# Patient Record
Sex: MALE | Race: OTHER | Employment: STUDENT | URBAN - METROPOLITAN AREA
[De-identification: names, ages, dates, MRNs, and addresses within clinical notes are randomized per-mention and may not be internally consistent; named-entity substitution may affect disease eponyms.]

---
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
## 2019-03-18 ENCOUNTER — OFFICE VISIT (OUTPATIENT)
Dept: FAMILY MEDICINE CLINIC | Facility: CLINIC | Age: 19
End: 2019-03-18

## 2019-03-18 ENCOUNTER — LAB ENCOUNTER (OUTPATIENT)
Dept: LAB | Age: 19
End: 2019-03-18
Attending: FAMILY MEDICINE
Payer: COMMERCIAL

## 2019-03-18 VITALS
HEART RATE: 60 BPM | WEIGHT: 217 LBS | BODY MASS INDEX: 29.39 KG/M2 | RESPIRATION RATE: 16 BRPM | TEMPERATURE: 98 F | SYSTOLIC BLOOD PRESSURE: 120 MMHG | HEIGHT: 72 IN | DIASTOLIC BLOOD PRESSURE: 70 MMHG

## 2019-03-18 DIAGNOSIS — T50.905A ADVERSE DRUG INTERACTION WITH DIETARY SUPPLEMENT: ICD-10-CM

## 2019-03-18 DIAGNOSIS — Z13.21 SCREENING FOR ENDOCRINE, NUTRITIONAL, METABOLIC AND IMMUNITY DISORDER: ICD-10-CM

## 2019-03-18 DIAGNOSIS — Z11.3 SCREEN FOR STD (SEXUALLY TRANSMITTED DISEASE): ICD-10-CM

## 2019-03-18 DIAGNOSIS — Z13.0 SCREENING FOR ENDOCRINE, NUTRITIONAL, METABOLIC AND IMMUNITY DISORDER: ICD-10-CM

## 2019-03-18 DIAGNOSIS — Z13.228 SCREENING FOR ENDOCRINE, NUTRITIONAL, METABOLIC AND IMMUNITY DISORDER: ICD-10-CM

## 2019-03-18 DIAGNOSIS — Z13.29 SCREENING FOR ENDOCRINE, NUTRITIONAL, METABOLIC AND IMMUNITY DISORDER: ICD-10-CM

## 2019-03-18 DIAGNOSIS — Z00.00 ANNUAL PHYSICAL EXAM: Primary | ICD-10-CM

## 2019-03-18 LAB
ALBUMIN SERPL-MCNC: 4.1 G/DL (ref 3.4–5)
ALBUMIN/GLOB SERPL: 1.1 {RATIO} (ref 1–2)
ALP LIVER SERPL-CCNC: 82 U/L (ref 45–117)
ALT SERPL-CCNC: 36 U/L (ref 16–61)
ANION GAP SERPL CALC-SCNC: 7 MMOL/L (ref 0–18)
AST SERPL-CCNC: 25 U/L (ref 15–37)
BASOPHILS # BLD AUTO: 0.05 X10(3) UL (ref 0–0.2)
BASOPHILS NFR BLD AUTO: 0.7 %
BILIRUB SERPL-MCNC: 1.3 MG/DL (ref 0.1–2)
BUN BLD-MCNC: 19 MG/DL (ref 7–18)
BUN/CREAT SERPL: 15.4 (ref 10–20)
CALCIUM BLD-MCNC: 9.4 MG/DL (ref 8.5–10.1)
CHLORIDE SERPL-SCNC: 104 MMOL/L (ref 98–107)
CHOLEST SMN-MCNC: 135 MG/DL (ref ?–200)
CO2 SERPL-SCNC: 28 MMOL/L (ref 21–32)
CREAT BLD-MCNC: 1.23 MG/DL (ref 0.7–1.3)
DEPRECATED RDW RBC AUTO: 41.3 FL (ref 35.1–46.3)
EOSINOPHIL # BLD AUTO: 0.44 X10(3) UL (ref 0–0.7)
EOSINOPHIL NFR BLD AUTO: 6 %
ERYTHROCYTE [DISTWIDTH] IN BLOOD BY AUTOMATED COUNT: 12.7 % (ref 11–15)
FSH SERPL-ACNC: 1.3 MIU/ML (ref 0.7–10.8)
GLOBULIN PLAS-MCNC: 3.6 G/DL (ref 2.8–4.4)
GLUCOSE BLD-MCNC: 98 MG/DL (ref 70–99)
HCT VFR BLD AUTO: 47.7 % (ref 39–53)
HDLC SERPL-MCNC: 45 MG/DL (ref 40–59)
HGB BLD-MCNC: 16 G/DL (ref 13–17.5)
IMM GRANULOCYTES # BLD AUTO: 0.02 X10(3) UL (ref 0–1)
IMM GRANULOCYTES NFR BLD: 0.3 %
LDLC SERPL CALC-MCNC: 77 MG/DL (ref ?–100)
LH SERPL-ACNC: 2.6 MIU/ML (ref 1.2–10.6)
LYMPHOCYTES # BLD AUTO: 2.63 X10(3) UL (ref 1.5–5)
LYMPHOCYTES NFR BLD AUTO: 35.9 %
M PROTEIN MFR SERPL ELPH: 7.7 G/DL (ref 6.4–8.2)
MCH RBC QN AUTO: 29.5 PG (ref 26–34)
MCHC RBC AUTO-ENTMCNC: 33.5 G/DL (ref 31–37)
MCV RBC AUTO: 87.8 FL (ref 80–100)
MONOCYTES # BLD AUTO: 0.36 X10(3) UL (ref 0.1–1)
MONOCYTES NFR BLD AUTO: 4.9 %
NEUTROPHILS # BLD AUTO: 3.82 X10 (3) UL (ref 1.5–7.7)
NEUTROPHILS # BLD AUTO: 3.82 X10(3) UL (ref 1.5–7.7)
NEUTROPHILS NFR BLD AUTO: 52.2 %
NONHDLC SERPL-MCNC: 90 MG/DL (ref ?–130)
OSMOLALITY SERPL CALC.SUM OF ELEC: 290 MOSM/KG (ref 275–295)
PLATELET # BLD AUTO: 240 10(3)UL (ref 150–450)
POTASSIUM SERPL-SCNC: 3.9 MMOL/L (ref 3.5–5.1)
RBC # BLD AUTO: 5.43 X10(6)UL (ref 4.3–5.7)
SODIUM SERPL-SCNC: 139 MMOL/L (ref 136–145)
T4 FREE SERPL-MCNC: 1.2 NG/DL (ref 0.9–1.6)
TRIGL SERPL-MCNC: 63 MG/DL (ref 30–149)
TSI SER-ACNC: 4.51 MIU/ML (ref 0.36–3.74)
VLDLC SERPL CALC-MCNC: 13 MG/DL (ref 0–30)
WBC # BLD AUTO: 7.3 X10(3) UL (ref 4–11)

## 2019-03-18 PROCEDURE — 84403 ASSAY OF TOTAL TESTOSTERONE: CPT

## 2019-03-18 PROCEDURE — 36415 COLL VENOUS BLD VENIPUNCTURE: CPT

## 2019-03-18 PROCEDURE — 83002 ASSAY OF GONADOTROPIN (LH): CPT

## 2019-03-18 PROCEDURE — 99395 PREV VISIT EST AGE 18-39: CPT | Performed by: FAMILY MEDICINE

## 2019-03-18 PROCEDURE — 84402 ASSAY OF FREE TESTOSTERONE: CPT

## 2019-03-18 PROCEDURE — 83001 ASSAY OF GONADOTROPIN (FSH): CPT

## 2019-03-18 PROCEDURE — 84443 ASSAY THYROID STIM HORMONE: CPT

## 2019-03-18 PROCEDURE — 85025 COMPLETE CBC W/AUTO DIFF WBC: CPT

## 2019-03-18 PROCEDURE — 80053 COMPREHEN METABOLIC PANEL: CPT

## 2019-03-18 PROCEDURE — 80061 LIPID PANEL: CPT

## 2019-03-18 PROCEDURE — 84439 ASSAY OF FREE THYROXINE: CPT

## 2019-03-18 NOTE — PROGRESS NOTES
Chief Complaint:   Patient presents with: Annual    HPI:   This is a 22year old male presenting for annual physical.     Patient would like STD screen, currently asymptomatic.    Patient denies shortness of breath, denies chest pain and denies any recent abdominal distention. Endocrine: Negative for cold intolerance, heat intolerance, polydipsia, polyphagia and polyuria. Genitourinary: Negative for dysuria, hematuria, flank pain and difficulty urinating.    Musculoskeletal: Negative for joint pain, gait Abdominal: Soft. Bowel sounds are normal. He exhibits no distension. There is no tenderness. There is no rebound and no guarding. Musculoskeletal: Normal range of motion. He exhibits no tenderness or effusion.    Lymphadenopathy:     He has no cervical

## 2019-03-22 ENCOUNTER — TELEPHONE (OUTPATIENT)
Dept: FAMILY MEDICINE CLINIC | Facility: CLINIC | Age: 19
End: 2019-03-22

## 2019-03-22 DIAGNOSIS — R79.89 ELEVATED TSH: Primary | ICD-10-CM

## 2019-03-22 NOTE — TELEPHONE ENCOUNTER
----- Message from Kerri Mejia MD sent at 3/22/2019 11:11 AM CDT -----  Slightly elevated TSH, recheck tsh and t4 in 6 months.

## 2019-03-22 NOTE — TELEPHONE ENCOUNTER
Called pt at number on HIPAA, no answer and VM not set up. Called numbers in pt's chart, received no answer (no consent or VM). Mychart msg sent.

## 2019-03-23 LAB
TESTOSTERONE TOTAL: 407 NG/DL
TESTOSTERONE, FREE -MS/MS: 117.3 PG/ML

## 2019-11-20 ENCOUNTER — TELEPHONE (OUTPATIENT)
Dept: FAMILY MEDICINE CLINIC | Facility: CLINIC | Age: 19
End: 2019-11-20

## 2020-08-04 ENCOUNTER — OFFICE VISIT (OUTPATIENT)
Dept: FAMILY MEDICINE CLINIC | Facility: CLINIC | Age: 20
End: 2020-08-04

## 2020-08-04 VITALS
DIASTOLIC BLOOD PRESSURE: 78 MMHG | WEIGHT: 255 LBS | HEIGHT: 71 IN | TEMPERATURE: 99 F | HEART RATE: 78 BPM | BODY MASS INDEX: 35.7 KG/M2 | OXYGEN SATURATION: 98 % | SYSTOLIC BLOOD PRESSURE: 122 MMHG | RESPIRATION RATE: 16 BRPM

## 2020-08-04 DIAGNOSIS — Z00.00 ANNUAL PHYSICAL EXAM: Primary | ICD-10-CM

## 2020-08-04 DIAGNOSIS — Z00.00 LABORATORY EXAMINATION ORDERED AS PART OF A ROUTINE GENERAL MEDICAL EXAMINATION: ICD-10-CM

## 2020-08-04 DIAGNOSIS — F98.8 ATTENTION DEFICIT DISORDER, UNSPECIFIED HYPERACTIVITY PRESENCE: ICD-10-CM

## 2020-08-04 DIAGNOSIS — S09.93XS: ICD-10-CM

## 2020-08-04 DIAGNOSIS — J32.0 CHRONIC MAXILLARY SINUSITIS: ICD-10-CM

## 2020-08-04 PROCEDURE — 3074F SYST BP LT 130 MM HG: CPT | Performed by: FAMILY MEDICINE

## 2020-08-04 PROCEDURE — 3008F BODY MASS INDEX DOCD: CPT | Performed by: FAMILY MEDICINE

## 2020-08-04 PROCEDURE — 99395 PREV VISIT EST AGE 18-39: CPT | Performed by: FAMILY MEDICINE

## 2020-08-04 PROCEDURE — 3078F DIAST BP <80 MM HG: CPT | Performed by: FAMILY MEDICINE

## 2020-08-04 NOTE — PROGRESS NOTES
Chief Complaint:   Patient presents with:  Physical    HPI:   This is a 20year old male presenting for annual physical.     Patient returns for recheck of ADD treatment,he would like to see [facility] before restarting add medication, he reports mild short term and voice change. Eyes: Negative for photophobia, pain, discharge, redness, itching and visual disturbance. Respiratory: Negative for cough, chest tightness and shortness of breath.     Cardiovascular: Negative for chest pain, palpitations and leg swel exhibits no discharge. Left eye exhibits no discharge. Neck: Normal range of motion. Neck supple. No JVD present. No tracheal deviation present. No thyromegaly present.    Cardiovascular: Normal rate, regular rhythm, normal heart sounds and intact distal

## 2020-08-05 ENCOUNTER — LABORATORY ENCOUNTER (OUTPATIENT)
Dept: LAB | Age: 20
End: 2020-08-05
Attending: PEDIATRICS
Payer: COMMERCIAL

## 2020-08-05 DIAGNOSIS — Z00.00 LABORATORY EXAMINATION ORDERED AS PART OF A ROUTINE GENERAL MEDICAL EXAMINATION: ICD-10-CM

## 2020-08-05 LAB
ALBUMIN SERPL-MCNC: 4.1 G/DL (ref 3.4–5)
ALBUMIN/GLOB SERPL: 1.1 {RATIO} (ref 1–2)
ALP LIVER SERPL-CCNC: 65 U/L (ref 45–117)
ALT SERPL-CCNC: 52 U/L (ref 16–61)
ANION GAP SERPL CALC-SCNC: 3 MMOL/L (ref 0–18)
AST SERPL-CCNC: 28 U/L (ref 15–37)
BASOPHILS # BLD AUTO: 0.03 X10(3) UL (ref 0–0.2)
BASOPHILS NFR BLD AUTO: 0.4 %
BILIRUB SERPL-MCNC: 2.2 MG/DL (ref 0.1–2)
BUN BLD-MCNC: 16 MG/DL (ref 7–18)
BUN/CREAT SERPL: 11.5 (ref 10–20)
CALCIUM BLD-MCNC: 9.5 MG/DL (ref 8.5–10.1)
CHLORIDE SERPL-SCNC: 105 MMOL/L (ref 98–112)
CHOLEST SMN-MCNC: 149 MG/DL (ref ?–200)
CO2 SERPL-SCNC: 31 MMOL/L (ref 21–32)
CREAT BLD-MCNC: 1.39 MG/DL (ref 0.7–1.3)
DEPRECATED RDW RBC AUTO: 42.3 FL (ref 35.1–46.3)
EOSINOPHIL # BLD AUTO: 0.32 X10(3) UL (ref 0–0.7)
EOSINOPHIL NFR BLD AUTO: 4.6 %
ERYTHROCYTE [DISTWIDTH] IN BLOOD BY AUTOMATED COUNT: 13.2 % (ref 11–15)
GLOBULIN PLAS-MCNC: 3.7 G/DL (ref 2.8–4.4)
GLUCOSE BLD-MCNC: 92 MG/DL (ref 70–99)
HCT VFR BLD AUTO: 48.7 % (ref 39–53)
HDLC SERPL-MCNC: 43 MG/DL (ref 40–59)
HGB BLD-MCNC: 16 G/DL (ref 13–17.5)
IMM GRANULOCYTES # BLD AUTO: 0.02 X10(3) UL (ref 0–1)
IMM GRANULOCYTES NFR BLD: 0.3 %
LDLC SERPL CALC-MCNC: 89 MG/DL (ref ?–100)
LYMPHOCYTES # BLD AUTO: 2.87 X10(3) UL (ref 1–4)
LYMPHOCYTES NFR BLD AUTO: 41.1 %
M PROTEIN MFR SERPL ELPH: 7.8 G/DL (ref 6.4–8.2)
MCH RBC QN AUTO: 28.8 PG (ref 26–34)
MCHC RBC AUTO-ENTMCNC: 32.9 G/DL (ref 31–37)
MCV RBC AUTO: 87.6 FL (ref 80–100)
MONOCYTES # BLD AUTO: 0.45 X10(3) UL (ref 0.1–1)
MONOCYTES NFR BLD AUTO: 6.4 %
NEUTROPHILS # BLD AUTO: 3.29 X10 (3) UL (ref 1.5–7.7)
NEUTROPHILS # BLD AUTO: 3.29 X10(3) UL (ref 1.5–7.7)
NEUTROPHILS NFR BLD AUTO: 47.2 %
NONHDLC SERPL-MCNC: 106 MG/DL (ref ?–130)
OSMOLALITY SERPL CALC.SUM OF ELEC: 289 MOSM/KG (ref 275–295)
PATIENT FASTING Y/N/NP: YES
PATIENT FASTING Y/N/NP: YES
PLATELET # BLD AUTO: 242 10(3)UL (ref 150–450)
POTASSIUM SERPL-SCNC: 4.2 MMOL/L (ref 3.5–5.1)
RBC # BLD AUTO: 5.56 X10(6)UL (ref 4.3–5.7)
SODIUM SERPL-SCNC: 139 MMOL/L (ref 136–145)
TRIGL SERPL-MCNC: 86 MG/DL (ref 30–149)
TSI SER-ACNC: 2.2 MIU/ML (ref 0.36–3.74)
VLDLC SERPL CALC-MCNC: 17 MG/DL (ref 0–30)
WBC # BLD AUTO: 7 X10(3) UL (ref 4–11)

## 2020-08-05 PROCEDURE — 85025 COMPLETE CBC W/AUTO DIFF WBC: CPT

## 2020-08-05 PROCEDURE — 84443 ASSAY THYROID STIM HORMONE: CPT

## 2020-08-05 PROCEDURE — 80061 LIPID PANEL: CPT

## 2020-08-05 PROCEDURE — 36415 COLL VENOUS BLD VENIPUNCTURE: CPT

## 2020-08-05 PROCEDURE — 80053 COMPREHEN METABOLIC PANEL: CPT

## 2020-08-08 ENCOUNTER — HOSPITAL ENCOUNTER (OUTPATIENT)
Dept: CT IMAGING | Age: 20
Discharge: HOME | End: 2020-08-08
Attending: FAMILY MEDICINE
Payer: COMMERCIAL

## 2020-08-08 DIAGNOSIS — J32.0 CHRONIC MAXILLARY SINUSITIS: ICD-10-CM

## 2020-08-08 PROCEDURE — 70486 CT MAXILLOFACIAL W/O DYE: CPT | Performed by: FAMILY MEDICINE

## 2021-04-09 DIAGNOSIS — Z23 NEED FOR VACCINATION: ICD-10-CM

## 2021-06-28 ENCOUNTER — TELEPHONE (OUTPATIENT)
Dept: FAMILY MEDICINE CLINIC | Facility: CLINIC | Age: 21
End: 2021-06-28

## 2021-06-28 ENCOUNTER — HOSPITAL ENCOUNTER (OUTPATIENT)
Dept: CT IMAGING | Age: 21
Discharge: HOME | End: 2021-06-28
Attending: FAMILY MEDICINE
Payer: COMMERCIAL

## 2021-06-28 ENCOUNTER — OFFICE VISIT (OUTPATIENT)
Dept: FAMILY MEDICINE CLINIC | Facility: CLINIC | Age: 21
End: 2021-06-28

## 2021-06-28 VITALS
RESPIRATION RATE: 18 BRPM | SYSTOLIC BLOOD PRESSURE: 114 MMHG | WEIGHT: 239 LBS | BODY MASS INDEX: 33.46 KG/M2 | TEMPERATURE: 98 F | HEIGHT: 71 IN | OXYGEN SATURATION: 99 % | HEART RATE: 94 BPM | DIASTOLIC BLOOD PRESSURE: 84 MMHG

## 2021-06-28 DIAGNOSIS — R10.9 LEFT FLANK PAIN: ICD-10-CM

## 2021-06-28 DIAGNOSIS — R10.9 LEFT FLANK PAIN: Primary | ICD-10-CM

## 2021-06-28 PROCEDURE — 99213 OFFICE O/P EST LOW 20 MIN: CPT | Performed by: FAMILY MEDICINE

## 2021-06-28 PROCEDURE — 3079F DIAST BP 80-89 MM HG: CPT | Performed by: FAMILY MEDICINE

## 2021-06-28 PROCEDURE — 3008F BODY MASS INDEX DOCD: CPT | Performed by: FAMILY MEDICINE

## 2021-06-28 PROCEDURE — 3074F SYST BP LT 130 MM HG: CPT | Performed by: FAMILY MEDICINE

## 2021-06-28 PROCEDURE — 81003 URINALYSIS AUTO W/O SCOPE: CPT | Performed by: FAMILY MEDICINE

## 2021-06-28 NOTE — IMAGING NOTE
Patient arrived for Oral contrast at 1210. Detailed drinking instructions were given to patient along with the oral contrast. Contrast and drinking instructions were left at the facility and patient left without informing staff.  Several attempts were made

## 2021-06-28 NOTE — PATIENT INSTRUCTIONS
1. Ok to start tylenol as needed for pain control. Can take up to Tylenol 1000mg 4 times per day as needed.   2. Call us back or present to ED for any worsening pain, developing of abdominal distention or rigidity, fevers, bloody bowel movements etc.   Lázaro Hanley weakness, or fainting  · Blood in your urine  · Burning feeling when you urinate or the need to urinate often  · Pain in one of your legs that gets worse  · Numbness or weakness in a leg  Diann last reviewed this educational content on 10/1/2019  © 2000

## 2021-06-28 NOTE — TELEPHONE ENCOUNTER
Phone call received from CT department [phone] states Pt was there to complete STAT CT.  Pt arrived early as instructed to drink contrast. Per tech, Pt was asked to drink contrast and wait in waiting area or  go home and return to complete test. Pt l

## 2021-06-28 NOTE — PROGRESS NOTES
[address] [facility] Family Medicine Office Note  Chief Complaint:   Patient presents with:  Low Back Pain: LT sided low back pain x this morning/ range 8/10 pain this morning. Pt notes pain when taking breaths. Pt denies trauma.  Pt notes was drinking last vomiting. Genitourinary: Positive for decreased urine volume. Skin: Negative for rash. Neurological: Negative for syncope.         EXAM:   /84   Pulse 94   Temp 97.8 °F (36.6 °C) (Temporal)   Resp 18   Ht 5' 11\" (1.803 m)   Wt 239 lb (108.4 kg) ABDOMEN+PELVIS(CONTRAST ONLY)(CPT=74177);  Future  - URINALYSIS, AUTO, W/O SCOPE      Meds & Refills for this Visit:  Requested Prescriptions      No prescriptions requested or ordered in this encounter       Health Maintenance:  COVID-19 Vaccine(1) Never d

## 2021-11-05 ENCOUNTER — OFFICE VISIT (OUTPATIENT)
Dept: FAMILY MEDICINE CLINIC | Facility: CLINIC | Age: 21
End: 2021-11-05

## 2021-11-05 ENCOUNTER — LAB ENCOUNTER (OUTPATIENT)
Dept: LAB | Age: 21
End: 2021-11-05
Attending: FAMILY MEDICINE
Payer: COMMERCIAL

## 2021-11-05 ENCOUNTER — HOSPITAL ENCOUNTER (OUTPATIENT)
Dept: CT IMAGING | Age: 21
Discharge: HOME | End: 2021-11-05
Attending: FAMILY MEDICINE
Payer: COMMERCIAL

## 2021-11-05 VITALS
HEIGHT: 71 IN | WEIGHT: 187 LBS | HEART RATE: 121 BPM | OXYGEN SATURATION: 98 % | BODY MASS INDEX: 26.18 KG/M2 | DIASTOLIC BLOOD PRESSURE: 70 MMHG | SYSTOLIC BLOOD PRESSURE: 122 MMHG | RESPIRATION RATE: 18 BRPM

## 2021-11-05 DIAGNOSIS — R63.4 UNINTENTIONAL WEIGHT LOSS: Primary | ICD-10-CM

## 2021-11-05 DIAGNOSIS — R63.4 UNINTENTIONAL WEIGHT LOSS: ICD-10-CM

## 2021-11-05 DIAGNOSIS — Z00.00 LABORATORY EXAMINATION ORDERED AS PART OF A ROUTINE GENERAL MEDICAL EXAMINATION: ICD-10-CM

## 2021-11-05 DIAGNOSIS — F41.9 ANXIETY: ICD-10-CM

## 2021-11-05 PROCEDURE — 80053 COMPREHEN METABOLIC PANEL: CPT

## 2021-11-05 PROCEDURE — 99214 OFFICE O/P EST MOD 30 MIN: CPT | Performed by: FAMILY MEDICINE

## 2021-11-05 PROCEDURE — 84443 ASSAY THYROID STIM HORMONE: CPT

## 2021-11-05 PROCEDURE — 71250 CT THORAX DX C-: CPT | Performed by: FAMILY MEDICINE

## 2021-11-05 PROCEDURE — 82306 VITAMIN D 25 HYDROXY: CPT

## 2021-11-05 PROCEDURE — 36415 COLL VENOUS BLD VENIPUNCTURE: CPT

## 2021-11-05 PROCEDURE — 83036 HEMOGLOBIN GLYCOSYLATED A1C: CPT

## 2021-11-05 PROCEDURE — 85025 COMPLETE CBC W/AUTO DIFF WBC: CPT

## 2021-11-05 PROCEDURE — 3078F DIAST BP <80 MM HG: CPT | Performed by: FAMILY MEDICINE

## 2021-11-05 PROCEDURE — 86140 C-REACTIVE PROTEIN: CPT

## 2021-11-05 PROCEDURE — 83516 IMMUNOASSAY NONANTIBODY: CPT

## 2021-11-05 PROCEDURE — 80061 LIPID PANEL: CPT

## 2021-11-05 PROCEDURE — 82607 VITAMIN B-12: CPT

## 2021-11-05 PROCEDURE — 3008F BODY MASS INDEX DOCD: CPT | Performed by: FAMILY MEDICINE

## 2021-11-05 PROCEDURE — 87389 HIV-1 AG W/HIV-1&-2 AB AG IA: CPT

## 2021-11-05 PROCEDURE — 3074F SYST BP LT 130 MM HG: CPT | Performed by: FAMILY MEDICINE

## 2021-11-05 PROCEDURE — 74176 CT ABD & PELVIS W/O CONTRAST: CPT | Performed by: FAMILY MEDICINE

## 2021-11-05 PROCEDURE — 85652 RBC SED RATE AUTOMATED: CPT

## 2021-11-05 RX ORDER — QUETIAPINE FUMARATE 50 MG/1
50 TABLET, EXTENDED RELEASE ORAL NIGHTLY
Qty: 30 TABLET | Refills: 2 | Status: ON HOLD | OUTPATIENT
Start: 2021-11-05 | End: 2021-11-06

## 2021-11-05 NOTE — PROGRESS NOTES
[address] [facility] Family Medicine Office Note  Chief Complaint:   Patient presents with:  Weight Loss: c/o unintentional weight loss x 05/2021. Pt notes has loss almost 60 lbs in the past 5 months.  Pt notes feeling lethargic / Pt notes \" feels hungry b No    Family History:  Family History   Problem Relation Age of Onset   • Anxiety Mother    • Depression Maternal Grandmother    • Substance Abuse Maternal Grandfather    • Depression Maternal Grandfather    • Anxiety Maternal Grandfather    • Substance Ab Tympanic membrane, ear canal and external ear normal.      Left Ear: Tympanic membrane, ear canal and external ear normal.      Nose: Nose normal.      Mouth/Throat:      Mouth: Mucous membranes are moist.      Pharynx: Oropharynx is clear.    Eyes:      [address] admission as an option but patient declined. He has no HI/SI. Physical exam wnl other than tachycardia. Follow up in 2 weeks or sooner PRN    - CBC WITH DIFFERENTIAL WITH PLATELET; Future  - COMP METABOLIC PANEL (14);  Future  - TSH W REFLEX TO [facility]

## 2021-11-05 NOTE — PATIENT INSTRUCTIONS
1.  Complete labwork     2. Follow up with psychiatry    3. Start seroquel (generic name quetiapine) daily. Call back if having any side effects.

## 2021-11-06 ENCOUNTER — HOSPITAL ENCOUNTER (INPATIENT)
Facility: HOSPITAL | Age: 21
LOS: 4 days | Discharge: HOME | DRG: 820 | End: 2021-11-10
Attending: EMERGENCY MEDICINE | Admitting: HOSPITALIST
Payer: COMMERCIAL

## 2021-11-06 ENCOUNTER — APPOINTMENT (OUTPATIENT)
Dept: CT IMAGING | Facility: HOSPITAL | Age: 21
DRG: 820 | End: 2021-11-06
Attending: EMERGENCY MEDICINE
Payer: COMMERCIAL

## 2021-11-06 ENCOUNTER — APPOINTMENT (OUTPATIENT)
Dept: CV DIAGNOSTICS | Facility: HOSPITAL | Age: 21
DRG: 820 | End: 2021-11-06
Attending: NURSE PRACTITIONER
Payer: COMMERCIAL

## 2021-11-06 ENCOUNTER — TELEPHONE (OUTPATIENT)
Dept: FAMILY MEDICINE CLINIC | Facility: CLINIC | Age: 21
End: 2021-11-06

## 2021-11-06 DIAGNOSIS — D64.9 ANEMIA, UNSPECIFIED TYPE: ICD-10-CM

## 2021-11-06 DIAGNOSIS — R79.89 ELEVATED LIVER FUNCTION TESTS: ICD-10-CM

## 2021-11-06 DIAGNOSIS — I31.3 PERICARDIAL EFFUSION: ICD-10-CM

## 2021-11-06 DIAGNOSIS — J90 PLEURAL EFFUSION ON RIGHT: ICD-10-CM

## 2021-11-06 DIAGNOSIS — J90 PLEURAL EFFUSION: ICD-10-CM

## 2021-11-06 DIAGNOSIS — R79.82 ELEVATED C-REACTIVE PROTEIN (CRP): ICD-10-CM

## 2021-11-06 DIAGNOSIS — R70.0 ELEVATED SED RATE: ICD-10-CM

## 2021-11-06 DIAGNOSIS — R59.0 MEDIASTINAL LYMPHADENOPATHY: Primary | ICD-10-CM

## 2021-11-06 PROBLEM — I31.39 PERICARDIAL EFFUSION (HCC): Status: ACTIVE | Noted: 2021-11-06

## 2021-11-06 PROBLEM — I31.39 PERICARDIAL EFFUSION: Status: ACTIVE | Noted: 2021-11-06

## 2021-11-06 PROCEDURE — 70470 CT HEAD/BRAIN W/O & W/DYE: CPT | Performed by: EMERGENCY MEDICINE

## 2021-11-06 PROCEDURE — 93306 TTE W/DOPPLER COMPLETE: CPT | Performed by: NURSE PRACTITIONER

## 2021-11-06 PROCEDURE — 99223 1ST HOSP IP/OBS HIGH 75: CPT | Performed by: INTERNAL MEDICINE

## 2021-11-06 RX ORDER — ACETAMINOPHEN 325 MG/1
650 TABLET ORAL EVERY 6 HOURS PRN
Status: DISCONTINUED | OUTPATIENT
Start: 2021-11-06 | End: 2021-11-08

## 2021-11-06 RX ORDER — ENOXAPARIN SODIUM 100 MG/ML
40 INJECTION SUBCUTANEOUS NIGHTLY
Status: DISCONTINUED | OUTPATIENT
Start: 2021-11-06 | End: 2021-11-08

## 2021-11-06 RX ORDER — ONDANSETRON 2 MG/ML
4 INJECTION INTRAMUSCULAR; INTRAVENOUS EVERY 6 HOURS PRN
Status: DISCONTINUED | OUTPATIENT
Start: 2021-11-06 | End: 2021-11-10

## 2021-11-06 RX ORDER — ERGOCALCIFEROL 1.25 MG/1
50000 CAPSULE ORAL
Status: DISCONTINUED | OUTPATIENT
Start: 2021-11-06 | End: 2021-11-10

## 2021-11-06 RX ORDER — SODIUM CHLORIDE 9 MG/ML
INJECTION, SOLUTION INTRAVENOUS CONTINUOUS
Status: DISCONTINUED | OUTPATIENT
Start: 2021-11-06 | End: 2021-11-10

## 2021-11-06 RX ORDER — LORAZEPAM 2 MG/ML
1 INJECTION INTRAMUSCULAR ONCE
Status: COMPLETED | OUTPATIENT
Start: 2021-11-07 | End: 2021-11-07

## 2021-11-06 NOTE — CONSULTS
[facility] Cardiovascular [place]  Report of Consultation    Neetu Pineda Patient Status:  Inpatient    2000 MRN [id number]   [facility] 4NW-A Attending Torsten Goldsmith MD   Hosp Day # 0 PCP Shalini Null MD     R Father    • Other (Other) Father    • Anxiety Mother    • Depression Maternal Grandmother    • Substance Abuse Maternal Grandfather    • Depression Maternal Grandfather    • Anxiety Maternal Grandfather    • Substance Abuse Paternal Grandfather       repor for subsequent hematologic work-up. 2.  Pericardial effusion  3. Elevated liver function tests with increased total bilirubin  4. Anemia  5.   ESR 96    We will monitor on telemetry and check follow-up blood work  Await echocardiogram for further assessm

## 2021-11-06 NOTE — CONSULTS
[facility] Report of Consultation      Patient Name: Zachary [place]   YOB: 2000  Medical Record Number: [id number]  Consulting Physician: Perla Villalba M.D.    Referring Physician: Kayla Suero MD    Date of Cons orthopnea. Gastrointestinal Decreased appetite. Genitourinary No hematuria. Dark colored urine. Musculoskeletal No edema; no joint pain, swelling, redness, change in range of motion. Integumentary No acute or chronic rashes.   Neurologic No headache, b Calcium, Total 9.1 8.5 - 10.1 mg/dL    Calculated Osmolality 274 (L) 275 - 295 mOsm/kg    GFR, Non- 142 >=60    GFR, -American 164 >=60    AST 24 15 - 37 U/L    ALT 62 (H) 16 - 61 U/L    Alkaline Phosphatase 235 (H) 45 - 117 U/L Range    Anti-Streptolysin O 1,600.0 (H) <408.0 IU/L   Hepatitis Panel, Acute (4)    Collection Time: 11/06/21  3:42 PM   Result Value Ref Range    Hepatitis A Ab, IgM Nonreactive  Nonreactive     Hepatitis B Core Ab, IgM Nonreactive  Nonreactive     Hepat CO2 29.0 21.0 - 32.0 mmol/L    Anion Gap 5 0 - 18 mmol/L    BUN 4 (L) 7 - 18 mg/dL    Creatinine 0.54 (L) 0.70 - 1.30 mg/dL    Calcium, Total 8.7 8.5 - 10.1 mg/dL    Calculated Osmolality 283 275 - 295 mOsm/kg    GFR, Non- 150 >=60    GF reduction techniques were used.  Dose information is transmitted to the ACR ([address] [place] [address] of Radiology)   [address] ([address]) which includes the Dose Index Registry.       PATIENT STATED HISTORY: (As transcribed by Technologist)  Pat  No free air or free fluid. ABDOMINAL WALL:  Unremarkable. PELVIC ORGANS:  Urinary bladder wall thickening is concerning for cystitis.  Clinical correlation recommended.  Unremarkable prostate gland.    LYMPH NODES:  No lymphadenopathy in the abdomen or gray-white matter differentiation.  No mass effect or midline shift.  No acute intracranial hemorrhage.  No abnormal parenchymal enhancement.  The visualized paranasal sinuses and mastoid air   cells are satisfactorily aerated.                            I effusion. Impressions:  No previous study was available for comparison.    *     ----------------------------------------------------------------------------   *   Left ventricle:  The cavity size was normal. Wall thickness was normal.   Systolic functi ----------------------------------------------------------------------------   Measurements      Left ventricle                          Value        Reference    LV ID, ED, PLAX                         5.5   cm     4.2 - 5.9    LV ID, ES, PLAX          Estimated CVP                           5     mm Hg  ---------        Right ventricle                         Value        Reference    RV pressure, S, DP                      27    mm Hg  ---------     Legend:   (L)  and  (H)  mickey values outside speci

## 2021-11-06 NOTE — TELEPHONE ENCOUNTER
Called and discussed results with patient via phone. Explained that tests show disease c/w lymphoma or leukemia.  He will be referred to oncologist.     Discussed case with [facility] hospitalist and due to pericardial effusion and pleural effusion - will refer

## 2021-11-06 NOTE — PROGRESS NOTES
NURSING ADMISSION NOTE      Patient admitted via Cart  Oriented to room. Safety precautions initiated. Bed in low position. Call light in reach. Pt admitted from ED, aaox4, accompanied by his parents, gen jaundice, seen by nikita Freeman and Yuliet Branham IV

## 2021-11-06 NOTE — H&P
[facility]                                                               History & Physical         Kymberly Lena Patient Status:  Inpatient    2000 MRN [id number]   [facility] 4NW-A Attending Solange Cardenas MD   [facility] Day history.     Family history:  Family History   Problem Relation Age of Onset   • Diabetes Father    • Other (Other) Father    • Anxiety Mother    • Depression Maternal Grandmother    • Substance Abuse Maternal Grandfather    • Depression Maternal Grandfathe BILT 3.5 11/06/2021    TP 8.3 11/06/2021    AST 24 11/06/2021    ALT 62 11/06/2021    PTT 39.2 11/06/2021    INR 1.33 11/06/2021    PTP 16.8 11/06/2021    ESRML 99 11/06/2021    TROP <0.045 11/06/2021       Recent Labs   Lab 11/06/21  1221   PTP 16.8*         Dictated by (CST): Vlad Kumari MD on 11/06/2021 at 1:46 PM          CT CHEST+ABDOMEN+PELVIS(CPT=71250/06514)       COMPARISON:  None.       INDICATIONS:  R63.4 Unintentional weight loss       TECHNIQUE:  Following oral contrast administration, un  Unremarkable. SPLEEN:  Splenomegaly measuring up to 14.1 cm. PANCREAS:  Unremarkable. ADRENALS:  Unremarkable. KIDNEYS:  No urinary calculi or obstructive uropathy. AORTA/VASCULAR:  Unremarkable. RETROPERITONEUM:  Unremarkable.    BOWEL/MESENTE B19  3. LDH normal at 183  4. Hematology Dr. Estefani Polo consulted from ER  5. Cardiology consulted due to extensive mediastinal lymphadenopathy with pericardial effusion, tachycardia, chest pain  6. C-reactive protein elevated at 20.90  7.  Sed rate elevated a

## 2021-11-06 NOTE — ED PROVIDER NOTES
Patient Seen in: [facility] Emergency Department      History   Patient presents with:  Abnormal Labs    Stated Complaint: abnormal CT     Subjective:   HPI    Patient was to his doctor's office yesterday.   Patient was complaining of unintentional w Systems    Positive for stated complaint: abnormal CT   Other systems are as noted in HPI. Constitutional and vital signs reviewed. All other systems reviewed and negative except as noted above.     Physical Exam     ED Triage Vitals [11/06/21 1145] REFLEX          An EKG was performed. I agree with computerized EKG interval interpretations. EKG shows sinus tachycardia. No low voltage findings. . There are no acute ST changes to suggest acute ischemia or infarct  Ventricular rate 106 bpm. VA interval

## 2021-11-06 NOTE — ED INITIAL ASSESSMENT (HPI)
Pt to ED after having CT scan yesterday which per mother showed Lymphoma. Mother states that pt has not been eating, drinking, and seems more letharigic.  Mom also notes yellowing of eyes,

## 2021-11-07 ENCOUNTER — APPOINTMENT (OUTPATIENT)
Dept: MRI IMAGING | Facility: HOSPITAL | Age: 21
DRG: 820 | End: 2021-11-07
Attending: SPECIALIST
Payer: COMMERCIAL

## 2021-11-07 PROCEDURE — 99255 IP/OBS CONSLTJ NEW/EST HI 80: CPT | Performed by: SPECIALIST

## 2021-11-07 PROCEDURE — 99233 SBSQ HOSP IP/OBS HIGH 50: CPT | Performed by: INTERNAL MEDICINE

## 2021-11-07 PROCEDURE — 74183 MRI ABD W/O CNTR FLWD CNTR: CPT | Performed by: SPECIALIST

## 2021-11-07 PROCEDURE — 76376 3D RENDER W/INTRP POSTPROCES: CPT | Performed by: SPECIALIST

## 2021-11-07 RX ORDER — POTASSIUM CHLORIDE 20 MEQ/1
40 TABLET, EXTENDED RELEASE ORAL ONCE
Status: COMPLETED | OUTPATIENT
Start: 2021-11-07 | End: 2021-11-07

## 2021-11-07 NOTE — CONSULTS
Brodie Villavicencio [address] [facility] [address] Note [facility]  Report of Consultation    Taty Kimball Patient Status:  Inpatient    2000 MRN [id number]   [facility] 4NW-A Attending Donny Corado HPI  Eyes: Negative for visual disturbance, irritation and redness. Ears, nose, mouth, throat, and face: Negative for hearing loss, tinnitus, nasal congestion, snoring, sore throat, hoarseness and voice change.   Respiratory: see HPI  Cardiovascular: see H icterus/hemorrhage, no conjunctival injection/discharge, nares normal  MOUTH: MMM, good dentition  NECK: Trachea midline, symmetric, no visible masses or scars, no crepitus, normal flexion/extension  CHEST: Normal chest excursion, no visible deformity or s 11/06/21  [address] 1.008   GLUUR Negative   BILUR Negative   KETUR Negative   BLOODURINE Small*   PHURINE 6.0   PROUR Negative   UROBILINOGEN 4.0*   NITRITE Negative   LEUUR Negative   WBCUR 1-5   RBCUR 0-2   BACU Infectious/inflammatory process also possible but less likely  · Right pleural effusion; pericardial effusion - likely related to above process  · Elevated LFTs, suspect related to above  · Normocytic anemia    PLAN  Imaging results reviewed in detail with

## 2021-11-07 NOTE — CONSULTS
[facility]                       Gastroenterology [address] Gastroenterology    Adine Gross Patient Status:  Inpatient    2000 MRN [id number]   [facility] 4NW-A Attending Cesar Peraza MD   [facility] mg, 1 mg, Intravenous, Once  acetaminophen (TYLENOL) tab 650 mg, 650 mg, Oral, Q6H PRN  Piperacillin Sod-Tazobactam So (ZOSYN) 3.375 g in dextrose 5% 100 mL IVPB-MBP, 3.375 g, Intravenous, Q8H        Allergies: No Known Allergies    SocHx:  Social History reports no history of seizure, stroke, or frequent headaches      PE: /67 (BP Location: Left arm)   Pulse 101   Temp (!) 100.6 °F (38.1 °C) (Oral)   Resp 16   Ht 6' (1.829 m)   Wt 182 lb 12.8 oz (82.9 kg)   SpO2 97%   BMI 41264.64 kg/m²     Gen: AAO 11/06/21  1542 11/07/21  0538   * 62* 62*   AST 80* 24 45*         Assessment and Plan:     -Patient with elevation of LFTs with hyperbilirubinemia in the setting of significant lymphadenopathy, weight loss, pericardial effusion overall concerning f

## 2021-11-07 NOTE — PROGRESS NOTES
Progress Note  Caden Scruggs Patient Status:  Inpatient    2000 MRN [id number]   [facility] 4NW-A Attending Damaris Hart MD   Hosp Day # 1 PCP Saima Patel MD     Subjective:  Sitting up in bed, family and specialist at bedsid 4. Splenomegaly. 5. Findings concerning for cystitis. ECHO:   1. Left ventricle: The cavity size was normal. Wall thickness was normal.      Systolic function was normal. The estimated ejection fraction was 55-60%.    2. Pulmonary arteries: Systoli

## 2021-11-07 NOTE — PLAN OF CARE
Pt received A&O x4. Appears jaundiced. Denies difficulty breathing. No complaints of pain. VSS. IV abx. IVF. Pt to have MRCP in AM. NPO maintained. Call light within reach. Will continue to monitor.        Problem: PAIN - ADULT  Goal: Verbalizes/displays ad GASTROINTESTINAL - ADULT  Goal: Maintains adequate nutritional intake (undernourished)  Description: INTERVENTIONS:  - Monitor percentage of each meal consumed  - Identify factors contributing to decreased intake, treat as appropriate  - Assist with meals

## 2021-11-07 NOTE — CONSULTS
Thoracic Surgery Consult      Name: Johnnie Tabares   Age: 23year old   Sex: male. MRN: [id number]    Reason for Consultation: mediastinal adenopathy    Consulting Physician: Max Garcia    Subjective:      Chief Complaint: \" I can barely move.  \" Maternal Grandmother    • Substance Abuse Maternal Grandfather    • Depression Maternal Grandfather    • Anxiety Maternal Grandfather    • Substance Abuse Paternal Grandfather          Review of Systems:  A 10 point review of systems was conducted and was 11/07/2021 05:38 AM    TP 7.6 11/07/2021 05:38 AM    ALT 62 (H) 11/07/2021 05:38 AM    AST 45 (H) 11/07/2021 05:38 AM       Review of Data:     CT reviewed personally.   Extensive adenopathy in the anterior mediastinum     Assessment/Plan:      Mr. Hanna Kaminski

## 2021-11-07 NOTE — PROGRESS NOTES
[facility]     Hospitalist Progress Note     Taty Kimball Patient Status:  Inpatient    2000 MRN [id number]   [facility] 4NW-A Attending Isadora Triana MD   Hosp Day # 1 PCP Janae Andrews MD     Chief Complaint: Weight loss, 29.0   ALKPHO 269* 235* 241*   AST 80* 24 45*   * 62* 62*   BILT 4.3* 3.5* 3.2*   TP 8.0 8.3* 7.6       Estimated Creatinine Clearance: 237.5 mL/min (A) (based on SCr of 0.54 mg/dL (L)).     Recent Labs   Lab 11/06/21  1221   PTP 16.8*   INR 1.33* sign of hydronephrosis.  No significant splenomegaly.  Abdominal aorta normal caliber.  Small right pleural effusion is noted, and trace left pleural effusion is present.  Pericardial effusion   is partially seen.                        Impression   CONCLU hepatitis A and C. Hepatitis B surface antigen and surface antibody reactive hepatitis B core antibody IgM nonreactive, ID and GI following. ID repeating HBsAg and if still positive planning on HBV viral load.   3. Hyponatremia due to dehydration  · Gentl

## 2021-11-07 NOTE — CONSULTS
INFECTIOUS DISEASE CONSULT NOTE    Rhondaquan Bryson Patient Status:  Inpatient    2000 MRN [id number]   [facility] 4NW-A Attending Christine Guzman MD   [facility] Day # 1 PCP Haim Munoz •  potassium chloride (K-DUR M20) CR tab 40 mEq, 40 mEq, Oral, Once  •  0.9% NaCl infusion, , Intravenous, Continuous  •  enoxaparin (LOVENOX) 40 MG/0.4ML injection 40 mg, 40 mg, Subcutaneous, Nightly  •  ondansetron (ZOFRAN) injection 4 mg, 4 mg, Olesya Confer 11/05/21  1025 11/07/21  0538   RBC 4.04* 3.77*   HGB 10.7* 10.1*   HCT 34.2* 31.8*   MCV 84.7 84.4   MCH 26.5 26.8   MCHC 31.3 31.8   RDW 15.8 16.1   NEPRELIM 9.97* 8.26*   WBC 11.6* 10.1   .0 319.0     Recent Labs   Lab 11/05/21  1025 11/06/21  15 The visualized paranasal sinuses and mastoid air cells are satisfactorily aerated. CONCLUSION:  No acute intracranial abnormality.      Dictated by (CST): Larry Strickland MD on 11/06/2021 at 1:46 PM     Finalized by (CST): Larry Strickland MD on 11/06/2 most pronounced along the right heart measuring up to 2.5 cm in thickness. Echocardiogram may be of further value. PLEURA:    Medium-sized right pleural effusion. CHEST WALL:    Unremarkable LIVER:  Unremarkable. BILIARY:  Unremarkable.  SPLEEN:  Splenomeg Services*                                                     [address], [address] normal. Wall thickness was normal. Systolic function was normal. The estimated ejection fraction was 55-60%. Left atrium:  The left atrium was normal in size. Right ventricle:   The cavity size was normal. Systolic function was normal. Right atrium:  The at %      25 - 43  LV mid-wall fx shortening, PLAX         18    %      14 - 22  LV PW thickness, ED, PLAX               0.8   cm     0.6 - 1.0  IVS/LV PW ratio, ED, PLAX               0.90         ---------  LV ejection fraction                    56    % ASSESSMENT:  1. Extensive mediastinal LAD- concerning for malignancy, although LDH is nl.   a. Some infections like TB or histoplasmosis could cause hilar/ mediastinal LAD  b.  Also consider inflammatory conditions like sarcoidosis, SLE could also

## 2021-11-08 ENCOUNTER — ANESTHESIA EVENT (OUTPATIENT)
Dept: CARDIAC SURGERY | Facility: HOSPITAL | Age: 21
DRG: 820 | End: 2021-11-08
Payer: COMMERCIAL

## 2021-11-08 ENCOUNTER — ANESTHESIA (OUTPATIENT)
Dept: CARDIAC SURGERY | Facility: HOSPITAL | Age: 21
DRG: 820 | End: 2021-11-08
Payer: COMMERCIAL

## 2021-11-08 PROCEDURE — 99232 SBSQ HOSP IP/OBS MODERATE 35: CPT | Performed by: INTERNAL MEDICINE

## 2021-11-08 PROCEDURE — 3E0T3BZ INTRODUCTION OF ANESTHETIC AGENT INTO PERIPHERAL NERVES AND PLEXI, PERCUTANEOUS APPROACH: ICD-10-PCS | Performed by: ANESTHESIOLOGY

## 2021-11-08 PROCEDURE — 07B74ZZ EXCISION OF THORAX LYMPHATIC, PERCUTANEOUS ENDOSCOPIC APPROACH: ICD-10-PCS | Performed by: THORACIC SURGERY (CARDIOTHORACIC VASCULAR SURGERY)

## 2021-11-08 PROCEDURE — 99232 SBSQ HOSP IP/OBS MODERATE 35: CPT | Performed by: SPECIALIST

## 2021-11-08 PROCEDURE — 0BJ08ZZ INSPECTION OF TRACHEOBRONCHIAL TREE, VIA NATURAL OR ARTIFICIAL OPENING ENDOSCOPIC: ICD-10-PCS | Performed by: THORACIC SURGERY (CARDIOTHORACIC VASCULAR SURGERY)

## 2021-11-08 RX ORDER — ACETAMINOPHEN 10 MG/ML
1000 INJECTION, SOLUTION INTRAVENOUS EVERY 6 HOURS
Status: COMPLETED | OUTPATIENT
Start: 2021-11-08 | End: 2021-11-09

## 2021-11-08 RX ORDER — LIDOCAINE HYDROCHLORIDE 10 MG/ML
INJECTION, SOLUTION EPIDURAL; INFILTRATION; INTRACAUDAL; PERINEURAL AS NEEDED
Status: DISCONTINUED | OUTPATIENT
Start: 2021-11-08 | End: 2021-11-08 | Stop reason: SURG

## 2021-11-08 RX ORDER — OXYCODONE HYDROCHLORIDE 5 MG/1
10 TABLET ORAL EVERY 4 HOURS PRN
Status: DISCONTINUED | OUTPATIENT
Start: 2021-11-08 | End: 2021-11-10

## 2021-11-08 RX ORDER — PHENYLEPHRINE HCL 10 MG/ML
VIAL (ML) INJECTION AS NEEDED
Status: DISCONTINUED | OUTPATIENT
Start: 2021-11-08 | End: 2021-11-08 | Stop reason: SURG

## 2021-11-08 RX ORDER — DOXEPIN HYDROCHLORIDE 50 MG/1
1 CAPSULE ORAL DAILY
Status: DISCONTINUED | OUTPATIENT
Start: 2021-11-08 | End: 2021-11-10

## 2021-11-08 RX ORDER — BUPIVACAINE HYDROCHLORIDE 2.5 MG/ML
INJECTION, SOLUTION EPIDURAL; INFILTRATION; INTRACAUDAL AS NEEDED
Status: DISCONTINUED | OUTPATIENT
Start: 2021-11-08 | End: 2021-11-08 | Stop reason: HOSPADM

## 2021-11-08 RX ORDER — KETAMINE HYDROCHLORIDE 50 MG/ML
INJECTION, SOLUTION, CONCENTRATE INTRAMUSCULAR; INTRAVENOUS AS NEEDED
Status: DISCONTINUED | OUTPATIENT
Start: 2021-11-08 | End: 2021-11-08 | Stop reason: SURG

## 2021-11-08 RX ORDER — MORPHINE SULFATE 2 MG/ML
2 INJECTION, SOLUTION INTRAMUSCULAR; INTRAVENOUS EVERY 4 HOURS PRN
Status: DISCONTINUED | OUTPATIENT
Start: 2021-11-08 | End: 2021-11-10

## 2021-11-08 RX ORDER — GLYCOPYRROLATE 0.2 MG/ML
INJECTION, SOLUTION INTRAMUSCULAR; INTRAVENOUS AS NEEDED
Status: DISCONTINUED | OUTPATIENT
Start: 2021-11-08 | End: 2021-11-08 | Stop reason: SURG

## 2021-11-08 RX ORDER — CEFAZOLIN SODIUM/WATER 2 G/20 ML
SYRINGE (ML) INTRAVENOUS AS NEEDED
Status: DISCONTINUED | OUTPATIENT
Start: 2021-11-08 | End: 2021-11-08 | Stop reason: SURG

## 2021-11-08 RX ORDER — ONDANSETRON 2 MG/ML
INJECTION INTRAMUSCULAR; INTRAVENOUS AS NEEDED
Status: DISCONTINUED | OUTPATIENT
Start: 2021-11-08 | End: 2021-11-08 | Stop reason: SURG

## 2021-11-08 RX ORDER — MORPHINE SULFATE 4 MG/ML
6 INJECTION, SOLUTION INTRAMUSCULAR; INTRAVENOUS EVERY 4 HOURS PRN
Status: DISCONTINUED | OUTPATIENT
Start: 2021-11-08 | End: 2021-11-10

## 2021-11-08 RX ORDER — ACETAMINOPHEN 10 MG/ML
INJECTION, SOLUTION INTRAVENOUS AS NEEDED
Status: DISCONTINUED | OUTPATIENT
Start: 2021-11-08 | End: 2021-11-08 | Stop reason: SURG

## 2021-11-08 RX ORDER — ENOXAPARIN SODIUM 100 MG/ML
40 INJECTION SUBCUTANEOUS NIGHTLY
Status: DISCONTINUED | OUTPATIENT
Start: 2021-11-09 | End: 2021-11-10

## 2021-11-08 RX ORDER — HYDROCODONE BITARTRATE AND ACETAMINOPHEN 5; 325 MG/1; MG/1
2 TABLET ORAL AS NEEDED
Status: DISCONTINUED | OUTPATIENT
Start: 2021-11-08 | End: 2021-11-08 | Stop reason: HOSPADM

## 2021-11-08 RX ORDER — ONDANSETRON 2 MG/ML
4 INJECTION INTRAMUSCULAR; INTRAVENOUS AS NEEDED
Status: DISCONTINUED | OUTPATIENT
Start: 2021-11-08 | End: 2021-11-08 | Stop reason: HOSPADM

## 2021-11-08 RX ORDER — KETOROLAC TROMETHAMINE 30 MG/ML
INJECTION, SOLUTION INTRAMUSCULAR; INTRAVENOUS AS NEEDED
Status: DISCONTINUED | OUTPATIENT
Start: 2021-11-08 | End: 2021-11-08 | Stop reason: SURG

## 2021-11-08 RX ORDER — MORPHINE SULFATE 4 MG/ML
4 INJECTION, SOLUTION INTRAMUSCULAR; INTRAVENOUS EVERY 4 HOURS PRN
Status: DISCONTINUED | OUTPATIENT
Start: 2021-11-08 | End: 2021-11-10

## 2021-11-08 RX ORDER — HYDROMORPHONE HYDROCHLORIDE 1 MG/ML
0.4 INJECTION, SOLUTION INTRAMUSCULAR; INTRAVENOUS; SUBCUTANEOUS EVERY 5 MIN PRN
Status: DISCONTINUED | OUTPATIENT
Start: 2021-11-08 | End: 2021-11-08 | Stop reason: HOSPADM

## 2021-11-08 RX ORDER — NALOXONE HYDROCHLORIDE 0.4 MG/ML
80 INJECTION, SOLUTION INTRAMUSCULAR; INTRAVENOUS; SUBCUTANEOUS AS NEEDED
Status: DISCONTINUED | OUTPATIENT
Start: 2021-11-08 | End: 2021-11-08 | Stop reason: HOSPADM

## 2021-11-08 RX ORDER — HYDROCODONE BITARTRATE AND ACETAMINOPHEN 5; 325 MG/1; MG/1
1 TABLET ORAL AS NEEDED
Status: DISCONTINUED | OUTPATIENT
Start: 2021-11-08 | End: 2021-11-08 | Stop reason: HOSPADM

## 2021-11-08 RX ORDER — MEPERIDINE HYDROCHLORIDE 25 MG/ML
12.5 INJECTION INTRAMUSCULAR; INTRAVENOUS; SUBCUTANEOUS AS NEEDED
Status: DISCONTINUED | OUTPATIENT
Start: 2021-11-08 | End: 2021-11-08 | Stop reason: HOSPADM

## 2021-11-08 RX ORDER — OXYCODONE HYDROCHLORIDE 5 MG/1
5 TABLET ORAL EVERY 4 HOURS PRN
Status: DISCONTINUED | OUTPATIENT
Start: 2021-11-08 | End: 2021-11-10

## 2021-11-08 RX ORDER — CEFAZOLIN SODIUM/WATER 2 G/20 ML
2 SYRINGE (ML) INTRAVENOUS EVERY 8 HOURS
Status: COMPLETED | OUTPATIENT
Start: 2021-11-08 | End: 2021-11-09

## 2021-11-08 RX ORDER — DEXAMETHASONE SODIUM PHOSPHATE 4 MG/ML
VIAL (ML) INJECTION AS NEEDED
Status: DISCONTINUED | OUTPATIENT
Start: 2021-11-08 | End: 2021-11-08 | Stop reason: SURG

## 2021-11-08 RX ORDER — KETOROLAC TROMETHAMINE 30 MG/ML
30 INJECTION, SOLUTION INTRAMUSCULAR; INTRAVENOUS EVERY 6 HOURS SCHEDULED
Status: COMPLETED | OUTPATIENT
Start: 2021-11-08 | End: 2021-11-10

## 2021-11-08 RX ORDER — SODIUM CHLORIDE, SODIUM LACTATE, POTASSIUM CHLORIDE, CALCIUM CHLORIDE 600; 310; 30; 20 MG/100ML; MG/100ML; MG/100ML; MG/100ML
INJECTION, SOLUTION INTRAVENOUS CONTINUOUS
Status: DISCONTINUED | OUTPATIENT
Start: 2021-11-08 | End: 2021-11-08 | Stop reason: HOSPADM

## 2021-11-08 RX ORDER — HYDROMORPHONE HYDROCHLORIDE 1 MG/ML
INJECTION, SOLUTION INTRAMUSCULAR; INTRAVENOUS; SUBCUTANEOUS
Status: COMPLETED
Start: 2021-11-08 | End: 2021-11-08

## 2021-11-08 RX ORDER — MIDAZOLAM HYDROCHLORIDE 1 MG/ML
INJECTION INTRAMUSCULAR; INTRAVENOUS AS NEEDED
Status: DISCONTINUED | OUTPATIENT
Start: 2021-11-08 | End: 2021-11-08 | Stop reason: SURG

## 2021-11-08 RX ORDER — NEOSTIGMINE METHYLSULFATE 1 MG/ML
INJECTION INTRAVENOUS AS NEEDED
Status: DISCONTINUED | OUTPATIENT
Start: 2021-11-08 | End: 2021-11-08 | Stop reason: SURG

## 2021-11-08 RX ORDER — ROCURONIUM BROMIDE 10 MG/ML
INJECTION, SOLUTION INTRAVENOUS AS NEEDED
Status: DISCONTINUED | OUTPATIENT
Start: 2021-11-08 | End: 2021-11-08 | Stop reason: SURG

## 2021-11-08 RX ADMIN — NEOSTIGMINE METHYLSULFATE 5 MG: 1 INJECTION INTRAVENOUS at 14:06:00

## 2021-11-08 RX ADMIN — ACETAMINOPHEN 1000 MG: 10 INJECTION, SOLUTION INTRAVENOUS at 13:14:00

## 2021-11-08 RX ADMIN — PHENYLEPHRINE HCL 100 MCG: 10 MG/ML VIAL (ML) INJECTION at 13:25:00

## 2021-11-08 RX ADMIN — LIDOCAINE HYDROCHLORIDE 50 MG: 10 INJECTION, SOLUTION EPIDURAL; INFILTRATION; INTRACAUDAL; PERINEURAL at 12:39:00

## 2021-11-08 RX ADMIN — DEXAMETHASONE SODIUM PHOSPHATE 8 MG: 4 MG/ML VIAL (ML) INJECTION at 12:46:00

## 2021-11-08 RX ADMIN — KETOROLAC TROMETHAMINE 30 MG: 30 INJECTION, SOLUTION INTRAMUSCULAR; INTRAVENOUS at 14:07:00

## 2021-11-08 RX ADMIN — ROCURONIUM BROMIDE 50 MG: 10 INJECTION, SOLUTION INTRAVENOUS at 12:39:00

## 2021-11-08 RX ADMIN — GLYCOPYRROLATE 0.8 MG: 0.2 INJECTION, SOLUTION INTRAMUSCULAR; INTRAVENOUS at 14:06:00

## 2021-11-08 RX ADMIN — KETAMINE HYDROCHLORIDE 25 MG: 50 INJECTION, SOLUTION, CONCENTRATE INTRAMUSCULAR; INTRAVENOUS at 12:37:00

## 2021-11-08 RX ADMIN — ONDANSETRON 4 MG: 2 INJECTION INTRAMUSCULAR; INTRAVENOUS at 14:07:00

## 2021-11-08 RX ADMIN — MIDAZOLAM HYDROCHLORIDE 2 MG: 1 INJECTION INTRAMUSCULAR; INTRAVENOUS at 12:37:00

## 2021-11-08 RX ADMIN — CEFAZOLIN SODIUM/WATER 2 G: 2 G/20 ML SYRINGE (ML) INTRAVENOUS at 12:54:00

## 2021-11-08 NOTE — PLAN OF CARE
Pt received A&Ox4. No c/o pain. Afebrile. VSS. Pt NPO for lymph node biopsy today. Consent in chart. IVF. Pt with poor appetite. Call light within reach. Will continue to monitor.

## 2021-11-08 NOTE — ANESTHESIA PREPROCEDURE EVALUATION
PRE-OP EVALUATION    Patient Name: Toy Méndez    Admit Diagnosis: Pericardial effusion [I31.3]  Mediastinal lymphadenopathy [R59.0]  Pleural effusion on right [J90]  Anemia, unspecified type [D64.9]    Pre-op Diagnosis: Pleural effusion [J90]    THO Neuro/Psych    Negative neuro/psych ROS.  (+) depression                        Patient Active Problem List:     Attention deficit disorder     Mediastinal lymphadenopathy     Pleural effusion on right     Pericardial effusion     Anemia, unspecified type lymphadenopathy  • Pleural effusion on right  • Pericardial effusion

## 2021-11-08 NOTE — PROGRESS NOTES
Received call from Infection Control regarding TB vs malignancy and need for isolation. Notified Dr. Prudence Almanza. Per Dr. Prudence Almanza, no TB isolation is needed. Pt currently in OR for VATS lymph node dissection.

## 2021-11-08 NOTE — DIETARY MALNUTRITION NOTE
[facility]  NUTRITION ASSESSMENT    Pt meets severe malnutrition criteria.     CRITERIA FOR MALNUTRITION DIAGNOSIS:  Criteria for severe malnutrition diagnosis: chronic illness related to wt loss greater than 10% in 6 months and energy intake less than education and handout. Mother interested in outpatient nutrition services; provided cancer center RD contact info. All questions answered at this time. PMH:  has a past medical history of Anxiety state, Depression, and Muscle weakness.     ANTHROPOMETRIC kcal/kg)  Protein:  grams protein/day (1.0-1.5 gm/kg)  Fluid: ~1 ml/kcal or per MD discretion    NUTRITION DIAGNOSIS/PROBLEM:  Malnutrition related to insufficient appetite resulting in inadequate nutrition intake as evidenced by documented/reported

## 2021-11-08 NOTE — OPERATIVE REPORT
[facility]    PATIENT'S NAME: Gi Dumont   ATTENDING PHYSICIAN: Giovani Alvarado M.D. OPERATING PHYSICIAN: Gabriele Franco MD   PATIENT ACCOUNT#:   [id number]    LOCATION:  PACU [facility] PACU 5 EDWP 10  MEDICAL RECORD #:   [id number]       DATE Catherine Sanchez crease and entered the chest bluntly. A camera through this incision revealed I was safely in the chest space. I then made 2 additional incisions, which were my standard incisions for a right-sided chest exploration.   An Raj wound retractor was placed Mary Anne Vickers MD  d: 11/08/2021 14:12:48  t: 11/08/2021 16:08:33  [facility] [id number]  JLL/

## 2021-11-08 NOTE — ANESTHESIA PROCEDURE NOTES
Arterial Line  Performed by: Daniela Miguel MD  Authorized by: Daniela Miguel MD     General Information and Staff    Procedure Start:   Anesthesiologist: Daniela Miguel MD  Performed By:  Anesthesiologist  Patient Location:  OR  Indication: continuou

## 2021-11-08 NOTE — PROGRESS NOTES
[facility]     Neetu Pineda Patient Status:  Inpatient    2000 MRN [id number]   Location [facility] Attending Torsten Goldsmith MD   [facility] Day # 2 PCP No data recorded    [facility](      Imaging: Imaging data reviewed in Epic.     Medications:   • [MAR Hold] multivitamin  1 tablet Oral Daily   • [Held by provider] enoxaparin  40 mg Subcutaneous Nightly   • [MAR Hold] ergocalciferol  50,000 U above  -Trend LFTs daily; if no etiology found on the above work-up, can consider liver biopsy in the future  -Depending on the above, can consider an upper endoscopy +/- colonoscopy (given weight loss) in the near future, but work-up of lymphadenopathy wi

## 2021-11-08 NOTE — PROGRESS NOTES
INFECTIOUS DISEASE PROGRESS NOTE    Lois Garrison Patient Status:  Inpatient    2000 MRN [id number]   [facility] 4NW-A Attending Arturo Manriquez MD   [facility] Day # 2 PCP Babar Pinto 11/08/21  0704   *  --  109* 103*  --    BUN 7  --  6* 4*  --    CREATSERUM 0.72  --  0.62* 0.54*  --    GFRAA 154  --  164 174  --    GFRNAA 133  --  142 150  --    CA 8.8  --  9.1 8.7  --    ALB 2.1*  --  2.0* 1.8*  --    *  --  133* 138  -- gray-white matter differentiation. No mass effect or midline shift. No acute intracranial hemorrhage. No abnormal parenchymal enhancement. The visualized paranasal sinuses and mastoid air cells are satisfactorily aerated.              CONCLUSION:  No ac measuring up to 3.2 x 2.0 cm. CARDIAC:    Possible malignant pericardial effusion with areas of fatty induration, soft tissue attenuation, and fluid most pronounced along the right heart measuring up to 2.5 cm in thickness.   Echocardiogram may be of furthe 2D DOPPLER (CPT=93306)    Result Date: 11/6/2021                                                     *[facility]* was available for comparison. * ---------------------------------------------------------------------------- * Left ventricle: The cavity size was normal. Wall thickness was normal. Systolic function was normal. The estimated ejection fraction was 55-60%. 5.5   cm     4.2 - 5.9  LV ID, ES, PLAX                         3.9   cm     ---------  LV fx shortening, PLAX                  30    %      25 - 43  LV mid-wall fx shortening, PLAX         18    %      14 - 22  LV PW thickness, ED, PLAX ---------------------------------------------------------------------------- Prepared and electronically signed by Shannen Trevino MD 11/06/2021 17:03          ASSESSMENT:  1. Extensive mediastinal LAD- concerning for malignancy, although LDH is nl.   a.  Some

## 2021-11-08 NOTE — ANESTHESIA PROCEDURE NOTES
Peripheral IV  Inserted by: Anna Vargas MD    Placement  Needle size: 18 G  Laterality: right  Location: forearm  Local anesthetic: none  Site prep: alcohol  Technique: anatomical landmarks  Attempts: 1

## 2021-11-08 NOTE — ANESTHESIA PROCEDURE NOTES
Airway  Date/Time: 11/8/2021 12:41 PM  Urgency: elective      General Information and Staff    Patient location during procedure: OR  Anesthesiologist: Leena Mahan MD  Performed: anesthesiologist     Indications and Patient Condition  Indications for a

## 2021-11-08 NOTE — PROGRESS NOTES
THE [facility] Hematology Oncology Group Progress Note      Patient Name: Ivonne Mckeon   YOB: 2000  Medical Record Number: [id number]    History of Present Illness  Harrison Briggs is a 23year old male who presented to ED on 11/06/2021 after and atraumatic. Eyes Conjunctiva clear; sclera anicteric. ENMT External nose normal; external ears normal.  Respiratory Normal effort; no respiratory distress. Cardiovascular RRR. Neurologic Motor and sensory grossly intact.   Psychiatric Mood and affec         Impression   CONCLUSION:  No biliary ductal dilation or speeches to indicate biliary ductal obstruction.           Dictated by (CST): Leslie Valentine MD on 11/07/2021 at 10:11 AM       Finalized by (CST): Leslie Valentine MD on 11/07/2021 at 10:13

## 2021-11-08 NOTE — PROGRESS NOTES
[facility]     Hospitalist Progress Note     Reji Moreno Patient Status:  Inpatient    2000 MRN [id number]   [facility] 4NW-A Attending Ana María Seaman MD   Hosp Day # 2 PCP Federica Coughlin MD     Chief Complaint: Weight loss, 138  --    K 3.8   < > 4.1 3.7 4.3   CL 98  --  100 104  --    CO2 27.0  --  28.0 29.0  --    ALKPHO 269*  --  235* 241*  --    AST 80*  --  24 45*  --    *  --  62* 62*  --    BILT 4.3*  --  3.5* 3.2*  --    TP 8.0  --  8.3* 7.6  --     < > = value dilation.  No features indicate biliary ductal obstruction.  The gallbladder is not dilated.  No gallbladder wall thickening or   surrounding fluid.  No upper abdominal ascites present.  No sign of hydronephrosis.  No significant splenomegaly.  Abdominal a liver function test due to above, also rule out hepatitis B  · Follow CMP in a.m.   ·  EBV virus, CMV virus, parvovirus B19 pending  · Seen by GI and had MRCP done which shows no biliary obstruction  · acute hepatitis panel nonreactive for hepatitis A and C

## 2021-11-08 NOTE — PLAN OF CARE
Pt received A&Ox4. Family at beside. Pt transferred for MRI this AM. Ativan given prior to transfer. Pulmonology and cardiothoracic surgery consulted per MD orders. Cardiology signed off. Tylenol administered this afternoon for 100.6F fever.  Repeat temp 98

## 2021-11-08 NOTE — PROGRESS NOTES
Pt transferred to OR via cart. Consents signed and on chart. Family at bedside and accompanied pt to OR. Informed pt and family if post-op pt were to transfer to different room, belongings will be packed and brought over.

## 2021-11-09 ENCOUNTER — APPOINTMENT (OUTPATIENT)
Dept: NUCLEAR MEDICINE | Facility: HOSPITAL | Age: 21
DRG: 820 | End: 2021-11-09
Attending: NURSE PRACTITIONER
Payer: COMMERCIAL

## 2021-11-09 PROCEDURE — 99232 SBSQ HOSP IP/OBS MODERATE 35: CPT | Performed by: INTERNAL MEDICINE

## 2021-11-09 NOTE — PROGRESS NOTES
Thoracic Surgery Progress Note     Kymberly Paredes is a 23year old male. MRN [id number]. Admitted 11/6/2021    SUBJECTIVE/24H EVENTS:     No acute events overnight.    Pain at chest tube site  Denies shortness of breath  Feels like he can't take deep br male POD 1 from right VATS lymph node biopsy.       Ambulate 3-4 times per day in the halls  Spend majority of day out of bed, in chair  Encouraged cough and IS use  Chest tube removed at beside without issue  Rest of care per primary team  Follow up appt i

## 2021-11-09 NOTE — PLAN OF CARE
Assumed care of patient around [address]. Alert and oriented x4. On room air, O2 sat 97%. NSR on tele. Voids. Continent of bowel and bladder. Up with SBA. R chest tube to wall suction with no air leak, dressing is C/D/I with scant drainage.  IVF infusing per ord Establish a toileting routine/schedule  - Consider collaborating with pharmacy to review patient's medication profile  Outcome: Progressing  Goal: Maintains adequate nutritional intake (undernourished)  Description: INTERVENTIONS:  - Monitor percentage of interventions  Outcome: Progressing

## 2021-11-09 NOTE — PROGRESS NOTES
INFECTIOUS DISEASE PROGRESS NOTE    Kiketyra Steward Patient Status:  Inpatient    2000 MRN [id number]   [facility] 4NW-A Attending Teena Martino MD   [facility] Day # 3 PCP Johanna Boyer rate and rhythm. No murmurs. Abdomen: Soft, nontender, nondistended. Positive bowel sounds. Musculoskeletal: Full range of motion of all extremities. No arthritis or deformity  Integument: No rash. No open wounds.     Laboratory Data:    Recent Labs Smear No organisms seen N/A   2. Anaerobic Culture     Status: None (Preliminary result)    Collection Time: 11/08/21  1:28 PM    Specimen: Lymph node;  Tissue   Result Value Ref Range    Anaerobic Culture Pending N/A   3. Body Fluid Cult Aerobic and Anaero abdominal ascites present. No sign of hydronephrosis. No significant splenomegaly. Abdominal aorta normal caliber. Small right pleural effusion is noted, and trace left pleural effusion is present. Pericardial effusion   is partially seen.      Impression: transmitted to the [facility] [employer] of Radiology) Bharat Irvin [address] ([address]) which includes the Dose Index Registry. PATIENT STATED HISTORY: (As transcribed by Technologist)  Patient states has had unintentional weight loss.    FINDINGS: thickening is concerning for cystitis. Clinical correlation recommended. Unremarkable prostate gland. LYMPH NODES:  No lymphadenopathy in the abdomen or pelvis. BONES:  Probable bone island in the left femoral head. OTHER:  None.               CONCLUSION: Referring:   Rose Crystal ---------------------------------------------------------------------------- History/Indications:  Abnormal CT scan. ---------------------------------------------------------------------------- Procedure information:  MARJORIE haji no stenosis. No significant regurgitation. Tricuspid valve:   Structurally normal valve. Leaflet separation was normal.  Doppler:  Transvalvular velocity was within the normal range. There was no evidence for stenosis. There was trivial regurgitation.  P ID, A-P, ES                  (L)     2.6   cm     3.0 - 4.0  LA ID/bsa, A-P                  (L)     1.3   cm/m^2 1. 5 - 2.3  LA volume, S                            38    ml     18 - 58  LA volume/bsa, S                        19    ml/m^2 16 - 28  LA volu increased bili- imaging of liver unremarkable. Hep B S ab is +, but also Ag noted to be + in one test, neg in another. ? Lab error  a. Doubt that his extensive mediastinal LAD would be related to HBV infection  4.  Elevated inflammatory markers (ESR/ CRP)-

## 2021-11-09 NOTE — PROGRESS NOTES
[facility]     Hospitalist Progress Note     Bolivar Leal Patient Status:  Inpatient    2000 MRN [id number]   [facility] 4NW-A Attending Stanley Fleischer, MD   Hosp Day # 3 PCP Emmett Brink MD     Chief Complaint: Weight loss, 150  --  158   CA 9.1  --  8.7  --  9.4   ALB 2.0*  --  1.8*  --  1.6*   *  --  138  --  137   K 4.1   < > 3.7 4.3 5.0     --  104  --  107   CO2 28.0  --  29.0  --  24.0   ALKPHO 235*  --  241*  --  172*   AST 24  --  45*  --  40*   ALT 62*  - caliber.  Pancreatic duct is normal in caliber.  Intrahepatic bile ducts do not show dilation.  No features indicate biliary ductal obstruction.  The gallbladder is not dilated.  No gallbladder wall thickening or   surrounding fluid.  No upper abdominal as pending. · Infectious disease specialist planning QuantiFERON testing and histoplasma serology which is pending. ID pressure is planning repeat HBsAg testing and if is positive planning on HBV viral load  2.  Elevated liver function test due to above, als

## 2021-11-09 NOTE — PLAN OF CARE
Pt A&Ox4. Pt has intermittent pain to right chest tube. Scheduled IV tylenol given. RN offered prn pain meds but patient declined at this time. Will continue to monitor pt's pain. Pt is NSR at rest and 's with activity. Lungs are dim on RA.  CT was pl Progressing     Problem: GASTROINTESTINAL - ADULT  Goal: Minimal or absence of nausea and vomiting  Description: INTERVENTIONS:  - Maintain adequate hydration with IV or PO as ordered and tolerated  - Nasogastric tube to low intermittent suction as ordered needed  - Assess and instruct to report SOB or any respiratory difficulty  - Respiratory Therapy support as indicated  - Manage/alleviate anxiety  - Monitor for signs/symptoms of CO2 retention  Outcome: Progressing

## 2021-11-09 NOTE — PROGRESS NOTES
[facility] Progress Note      Patient Name: Lakhwinder Hairston   YOB: 2000  Medical Record Number: [id number]    History of Present Illness  Iglesia Ace is a 23year old male who presented to ED on 11/06/2021 after iohexol (OMNIPAQUE) 350 MG/ML injection 75 mL, 75 mL, Intravenous, ONCE PRN  0.9% NaCl infusion, , Intravenous, Continuous  ondansetron (ZOFRAN) injection 4 mg, 4 mg, Intravenous, Q6H PRN  ergocalciferol (DRISDOL/VITAMIN D2) cap 50,000 Units, 50,000 Units, 21.0 - 32.0 mmol/L    Anion Gap 6 0 - 18 mmol/L    BUN 9 7 - 18 mg/dL    Creatinine 0.48 (L) 0.70 - 1.30 mg/dL    Calcium, Total 9.4 8.5 - 10.1 mg/dL    Calculated Osmolality 285 275 - 295 mOsm/kg    GFR, Non- 158 >=60    GFR, -Ameri projection and source MRCP images are evaluated.  Subsequent post contrast imaging was performed after intravenous administration of paramagnetic contrast agent.       3-D RENDERING:  Additional 3-D rendering was generated by the technologistJuliette PAREDES keep him in house until all results are available. Electronically signed by:    Tamica Frazier M.D.   Associate Medical Director of Oncology [facility], [place], [place]

## 2021-11-09 NOTE — PROGRESS NOTES
[facility] Gastroenterology     Lois Garrison Patient Status:  Inpatient    2000 MRN [id number]   Location [facility] Attending Arturo Manriquez MD   [facility] Day # 3 PCP --  49   BILT 3.5*  --  3.2*  --  1.5   TP 8.3*  --  7.6  --  7.5    < > = values in this interval not displayed.        Recent Labs   Lab 11/06/21  1221   PTP 16.8*   INR 1.33*            No data recorded    @[email or web address](      Imaging: Imaging data reviewe for possible hepatitis B infection; pt states he was vaccinated against Hep B , therefore, unclear meaning of Hep B Ag positive; Hep B core total and IgM neg; ID following as well;  We will look to obtain chronic liver disease work-up with autoimmune serolo

## 2021-11-09 NOTE — PROGRESS NOTES
[facility] Pulmonary/Critical Care Progress Note     SUBJECTIVE/24H Events: All events, procedures, notes reviewed. Resting in bed. Mild discomfort at chest tube site. Breathing ok.         Review of Systems:   A compr 11.6* 10.1 17.2*   .0 319.0 379.0     No results for input(s): BNP in the last 168 hours.   Recent Labs   Lab 11/06/21  1221   TROP <0.045     Recent Labs   Lab 11/06/21  1221   INR 1.33*   PTT 39.2*         • multivitamin  1 tablet Oral Daily   • en

## 2021-11-09 NOTE — PLAN OF CARE
Assumed pt care at 1700. A&Ox4. Room air, pain on right side chest tube site, denies shortness of breath, vitals stable, NSR on tele. Voids in urinal. Up w standby. Chest tube to suction.  Plan to monitor chest tube site, change to water seal tomorrow in am

## 2021-11-10 VITALS
DIASTOLIC BLOOD PRESSURE: 56 MMHG | SYSTOLIC BLOOD PRESSURE: 116 MMHG | TEMPERATURE: 97 F | OXYGEN SATURATION: 97 % | HEART RATE: 92 BPM | BODY MASS INDEX: 24.76 KG/M2 | WEIGHT: 182.81 LBS | HEIGHT: 72 IN | RESPIRATION RATE: 16 BRPM

## 2021-11-10 PROCEDURE — 90792 PSYCH DIAG EVAL W/MED SRVCS: CPT | Performed by: OTHER

## 2021-11-10 PROCEDURE — 99239 HOSP IP/OBS DSCHRG MGMT >30: CPT | Performed by: INTERNAL MEDICINE

## 2021-11-10 PROCEDURE — 99231 SBSQ HOSP IP/OBS SF/LOW 25: CPT | Performed by: NURSE PRACTITIONER

## 2021-11-10 RX ORDER — TRAMADOL HYDROCHLORIDE 50 MG/1
50 TABLET ORAL EVERY 6 HOURS PRN
Qty: 10 TABLET | Refills: 0 | Status: SHIPPED | OUTPATIENT
Start: 2021-11-10 | End: 2021-12-06

## 2021-11-10 RX ORDER — ACETAMINOPHEN 325 MG/1
650 TABLET ORAL EVERY 4 HOURS
Status: DISCONTINUED | OUTPATIENT
Start: 2021-11-10 | End: 2021-11-10

## 2021-11-10 NOTE — PROGRESS NOTES
[facility]     Hospitalist Progress Note     Hola Ta Patient Status:  Inpatient    2000 MRN [id number]   [facility] 4NW-A Attending Thalia Yee MD   [facility] Day # 4 PCP Mitesh Shannon MD     Chief Complaint: Weight loss, 9.1  --  8.7  --  9.4  --    ALB 2.0*   < > 1.8*  --  1.6* 1.4*   *  --  138  --  137  --    K 4.1   < > 3.7 4.3 5.0  --      --  104  --  107  --    CO2 28.0  --  29.0  --  24.0  --    ALKPHO 235*   < > 241*  --  172* 137*   AST 24   < > 45* The common bile duct is normal in caliber.  Pancreatic duct is normal in caliber.  Intrahepatic bile ducts do not show dilation.  No features indicate biliary ductal obstruction.  The gallbladder is not dilated.  No gallbladder wall thickening or   surro load  2. Elevated liver function test due to above, also rule out hepatitis B  · Follow CMP in a.m.   ·  EBV virus, CMV virus, parvovirus B19 pending  · Seen by GI and had MRCP done which shows no biliary obstruction  · acute hepatitis panel nonreactive for

## 2021-11-10 NOTE — PLAN OF CARE
Pt A&Ox4. Pt has intermittent pain to r lung. CT dressing changed. CDI currently. Lungs clear on RA. IS 1000. NSR On tele. Pt denies wanting to ambulate or wash up in shower. Pt has flat affect. Dr. Trenton Amador to see patient today. Skin appears jaundice.  Pt has mentation and behavior  - Position to facilitate oxygenation and minimize respiratory effort  - Oxygen supplementation based on oxygen saturation or ABGs  - Provide Smoking Cessation handout, if applicable  - Encourage broncho-pulmonary hygiene including c

## 2021-11-10 NOTE — PROGRESS NOTES
Problem: Adult Inpatient Plan of Care  Goal: Plan of Care Review  Outcome: Ongoing, Progressing  Flowsheets (Taken 6/12/2020 0112)  Plan of Care Reviewed With: patient  Pt had no adverse events during shift. Pt free from falls. Call light in reach. SR's x2. Pain well controlled w/ Dilaudid PCA pump. NG tube in place - low, continuous suction. Monitoring output. Verified pH for placement. Pt repositions independently. Incision intact, scant drainage, area marked. No output from ostomy. IVF's administered as ordered. Tele monitor (NSR - 70's/80's). Elevated BP treated per orders. VSS. Chart reviewed. Will continue to monitor.     Thoracic Surgery Progress Note     Solitario Tate is a 23year old male. MRN [id number]. Admitted 11/6/2021    [facility] EVENTS:     Still having difficulty with pain control. Avoiding taking in deep inspirations due to pain.        Objective: incision care and pain management after discharge. Pain medications sent to pharmacy. All questions answered at this time.       Michelle Hathaway PA-C  Thoracic Surgery  Pager: [phone]

## 2021-11-10 NOTE — PLAN OF CARE
NURSING DISCHARGE NOTE    Discharged Home via Wheelchair. Accompanied by Family member  Belongings Taken by patient/family. Discharge instructions reviewed with patient and family member. All questions answered as this time.  Follow up appointments Deepa Reyes RN  Outcome: Adequate for Discharge  11/10/2021 1055 by Chang Booker RN  Outcome: Progressing     Problem: GASTROINTESTINAL - ADULT  Goal: Minimal or absence of nausea and vomiting  Description: INTERVENTIONS:  - Maintain adequate hydration with INTERVENTIONS:  - Assess for changes in respiratory status  - Assess for changes in mentation and behavior  - Position to facilitate oxygenation and minimize respiratory effort  - Oxygen supplementation based on oxygen saturation or ABGs  - Provide Smoking

## 2021-11-10 NOTE — BH PROGRESS NOTE
Referrals placed in the discharge summary per Dr. Sabrina Garcia for the patient to follow up with a psychiatrist and psychotherapist.

## 2021-11-10 NOTE — PROGRESS NOTES
Hem/Onc Inpatient Note    Patient Name: Bolivar Leal   YOB: 2000   Medical Record Number: [id number]   CSN: [id number]   Attending Hematology/Oncology Physician: Dr. Casey Pennington    S: fatigued.  Still with dyspnea with activity    Allergies: 137 (H) 45 - 117 U/L    Bilirubin, Total 0.9 0.1 - 2.0 mg/dL    Bilirubin, Direct 0.7 (H) 0.0 - 0.2 mg/dL    Total Protein 6.3 (L) 6.4 - 8.2 g/dL    Albumin 1.4 (L) 3.4 - 5.0 g/dL       Radiology:  No results found. Impression/Plan    1.    Mediastinal l

## 2021-11-10 NOTE — PLAN OF CARE
Assumed care of patient around [address]. Alert and oriented x4. Denies any chest pain, lightheadedness or shortness of breath. Pt c/o incisional site pain but declines PRN pain medication at this time.  Incision site covered with 4x4 and tape, no drainage, C/D/ signs/symptoms of CO2 retention  Outcome: Progressing     Problem: Patient/Family Goals  Goal: Patient/Family Long Term Goal  Description: Patient's Long Term Goal:to go home    Interventions:  - chest tube management, biposy, labs, increase activity, inci

## 2021-11-10 NOTE — CONSULTS
[facility]  Report of Psychiatric Consultation    Toy Gutting Patient Status:  Inpatient    2000 MRN [id number]   [facility] 2NE-A Attending Sam Rodriguez MD   [address] Day # 4 PCP Heath Hill MD     Date of Admission:  feelings of hopelessness. He has NO hx of suicide attempts. He has been on Lexapro and received counseling before. His depression was in remission earlier in the year. In the end of June 2021, he developed pleuritic chest pain and left flank pain.  He w Maternal Grandmother    • Substance Abuse Maternal Grandfather    • Depression Maternal Grandfather    • Anxiety Maternal Grandfather    • Substance Abuse Paternal Grandfather       reports that he has never smoked.  He has never used smokeless tobacco. He attentive  Memory:  intact recent and intact remote  Language: Intact naming and repetition  Fund of Knowledge: Able to recite name of [place] president    Insight: limited  Judgment: limited    Laboratory Data:  Lab Results   Component Value Date    ALB 1.4 11  Possible malignant pericardial effusion with areas of fatty induration, soft tissue attenuation, and fluid most pronounced along the right heart measuring up to 2.5 cm in thickness.  Echocardiogram may be of further value.    PLEURA:    Medium-sized right

## 2021-11-10 NOTE — DISCHARGE SUMMARY
[facility]  Discharge Summary    Bolivar Leal Patient Status:  Inpatient    2000 MRN [id number]   [facility] ([place], [place]) 2NE-A Attending No att. providers found   Hosp Day # 4 PCP Emmett Brink MD     Date of Admission: 2021    Date lightheadedness. Denies any abdominal pain. Denies any nausea vomiting or diarrhea. No sick contact. Vaccinated for Covid.   Patient's dad and mom at bedside who reported that patient was a very healthy kid growing up and had no significant medical issu psychotherapy as outpatient and gave patient resources for the same.   · Pain controlled and okay to discharge today by all consultants with outpatient follow-up with oncology and outpatient PET scan per oncology  · Patient discharged home in stable conditi AND SMEAR In process   FUNGUS CULTURE AND SMEAR In process   FUNGUS CULTURE(P) In process   FUNGUS CULTURE(P) In process   Histoplasma Abs by CF and ID In process   Histoplasma Abs, Qn, DID In process   Parvovirus B19, Human, Igg/Igm In process   Anaerobic [address] [id number]  [phone]    Schedule an appointment as soon as possible for a visit in 1 week  For suture removal    Francisco Ochoa MD  [address] [address] [phone]    Schedule an appointment as so [id number]. You may park in the [address] [place], [place] [address]way. &nbsp; Enter the [facility] entrance and check in at the [address]. The phone number for this location is [phone].  Masks are required to be worn upon entering any [facility] please leave valuables/jewelry at home.  -For this test, you will receive an injection, rest for 30-90 minutes and take a 15-50 minute scan. -There are no side effects to the injection.      DIABETIC PATIENTS ONLY  -If you are taking Metformin, please with your appointment, you can speak to a  ([phone], option 5).                11/12/2021  1:00 PM  FOLLOW UP VISIT [2248] 40 min Best Rose, Vadim Hart MD    Patient Instructions:                    11/17/ room air  General: No acute distress. Respiratory: Clear to auscultation bilaterally. No wheezes. No rhonchi. R sided chest tube site healing well with no drainage, VATS incision in Dermabond and healing well with no erythema or drainage  Cardiovascular:

## 2021-11-10 NOTE — PROGRESS NOTES
[facility]     Thanhcony Ramirez Patient Status:  Inpatient    2000 MRN [id number]   Location [facility] Attending Kayla Suero MD   [facility] Day # 4 PCP 241*  --  172*   AST 24  --  45*  --  40*   ALT 62*  --  62*  --  49   BILT 3.5*  --  3.2*  --  1.5   TP 8.3*  --  7.6  --  7.5    < > = values in this interval not displayed.        Recent Labs   Lab 11/06/21  1221   PTP 16.8*   INR 1.33*            No da effusion overall concerning for possible malignancy. Other possible etiologies include infectious or inflammatory. The etiology of elevated LFTs is unclear as the MRI abdomen MRCP showed no biliary ductal dilation/liver abnormalities;  This may be a sequela

## 2021-11-11 ENCOUNTER — PATIENT OUTREACH (OUTPATIENT)
Dept: CASE MANAGEMENT | Age: 21
End: 2021-11-11

## 2021-11-11 DIAGNOSIS — Z02.9 ENCOUNTERS FOR ADMINISTRATIVE PURPOSE: ICD-10-CM

## 2021-11-11 NOTE — PROGRESS NOTES
NCM attempted to contact the patient for HFU. No answer and VM not set up. NCM will try again later.

## 2021-11-12 ENCOUNTER — HOSPITAL ENCOUNTER (OUTPATIENT)
Dept: NUCLEAR MEDICINE | Facility: HOSPITAL | Age: 21
Discharge: HOME | End: 2021-11-12
Attending: NURSE PRACTITIONER
Payer: COMMERCIAL

## 2021-11-12 DIAGNOSIS — R59.0 MEDIASTINAL LYMPHADENOPATHY: ICD-10-CM

## 2021-11-12 PROBLEM — C81.18 NODULAR SCLEROSIS HODGKIN LYMPHOMA OF LYMPH NODES OF MULTIPLE REGIONS (HCC): Status: ACTIVE | Noted: 2021-11-12

## 2021-11-12 PROCEDURE — 82962 GLUCOSE BLOOD TEST: CPT

## 2021-11-12 PROCEDURE — 78815 PET IMAGE W/CT SKULL-THIGH: CPT | Performed by: NURSE PRACTITIONER

## 2021-11-12 NOTE — PROGRESS NOTES
Called patient and spoke with his mom per HIPAA. She states that patient is currently getting his PET scan. She is requesting a call back later.

## 2021-11-12 NOTE — OPERATIVE REPORT
[facility]    PATIENT'S NAME: Milind Johnson   ATTENDING PHYSICIAN: Stan Davis M.D. OPERATING PHYSICIAN: Álvaro Lagos MD   PATIENT ACCOUNT#:   [id number]    LOCATION:  [address] [place] [address]  MEDICAL RECORD #:   [id number]       DATE OF BIR incision in the anterior axillary line at the inframammary crease and entered the chest bluntly. A camera through this incision revealed I was safely in the chest space.   I then made 2 additional incisions, which were my standard incisions for a right-brad None.    CONDITION:  Stable, to PACU. Dictated By Yasmeen Found  Geraldine Escalera MD  d: 11/08/2021 14:12:48  t: 11/12/2021 11:50:13  [facility] [id number]  JLFREDY/

## 2021-11-15 ENCOUNTER — LAB ENCOUNTER (OUTPATIENT)
Dept: LAB | Age: 21
End: 2021-11-15
Attending: FAMILY MEDICINE
Payer: COMMERCIAL

## 2021-11-15 ENCOUNTER — OFFICE VISIT (OUTPATIENT)
Dept: FAMILY MEDICINE CLINIC | Facility: CLINIC | Age: 21
End: 2021-11-15

## 2021-11-15 ENCOUNTER — TELEPHONE (OUTPATIENT)
Dept: HEMATOLOGY/ONCOLOGY | Age: 21
End: 2021-11-15

## 2021-11-15 VITALS
OXYGEN SATURATION: 98 % | HEART RATE: 106 BPM | SYSTOLIC BLOOD PRESSURE: 108 MMHG | RESPIRATION RATE: 18 BRPM | DIASTOLIC BLOOD PRESSURE: 60 MMHG | HEIGHT: 72 IN | BODY MASS INDEX: 25 KG/M2

## 2021-11-15 DIAGNOSIS — C81.18 NODULAR SCLEROSIS HODGKIN LYMPHOMA OF LYMPH NODES OF MULTIPLE REGIONS (HCC): Primary | ICD-10-CM

## 2021-11-15 DIAGNOSIS — Z52.89 SPERM DONOR: ICD-10-CM

## 2021-11-15 DIAGNOSIS — R59.0 MEDIASTINAL LYMPHADENOPATHY: ICD-10-CM

## 2021-11-15 PROCEDURE — 3074F SYST BP LT 130 MM HG: CPT | Performed by: FAMILY MEDICINE

## 2021-11-15 PROCEDURE — 3078F DIAST BP <80 MM HG: CPT | Performed by: FAMILY MEDICINE

## 2021-11-15 PROCEDURE — 3008F BODY MASS INDEX DOCD: CPT | Performed by: FAMILY MEDICINE

## 2021-11-15 PROCEDURE — 86780 TREPONEMA PALLIDUM: CPT

## 2021-11-15 PROCEDURE — 36415 COLL VENOUS BLD VENIPUNCTURE: CPT

## 2021-11-15 PROCEDURE — 99213 OFFICE O/P EST LOW 20 MIN: CPT | Performed by: FAMILY MEDICINE

## 2021-11-15 NOTE — PROGRESS NOTES
THE [facility]cology [facility] Progress Note      Patient Name: Marcelino Davila   YOB: 2000  Medical Record Number: [id number]  Attending Physician: Britt Toro M.D.      Date of Visit: 11/17/2021       Chief Complaint  Hodgkin lymph malignancy. Social History (historical data, reviewed by physician)  Denies tobacco use.      Current Medications  traMADol 50 MG Oral Tab, Take 1 tablet (50 mg total) by mouth every 6 (six) hours as needed for Pain., Disp: 10 tablet, Rfl: 0    Allergies [facility]    Collection Time: 11/17/21  1:48 PM   Result Value Ref Range    FSH 2.7 0.7 - 10.8 mIU/mL   TESTOSTERONE TOTAL    Collection Time: 11/17/21  1:48 PM   Result Value Ref Range    Testosterone 566.43 123.06 - 813.86 ng/dL   CBC W/ DIFFERENTIAL    Yusef BEACOPP chemotherapy (if inadequate response to ABVD).           I reviewed the curative goals of therapy and the potential adverse effects including, but not limited to, myelosuppression, cardiotoxicity, neurotoxicity, pulmonary fibrosis, MDS/leukemia, inf

## 2021-11-15 NOTE — TELEPHONE ENCOUNTER
Patient is 24years old and has a post hospital follow up on 11/17, mother of patient would like to know if his father can also come to the appointment

## 2021-11-15 NOTE — PROGRESS NOTES
[facility] Office Note  Chief Complaint:   Patient presents with:  Hospital F/U: Pt here to follow up Hospital inpatient 11/06- 11/10 for abnormal labs   Note: school note       HPI:   This is a 24year old male coming in for hosp Refill   • traMADol 50 MG Oral Tab Take 1 tablet (50 mg total) by mouth every 6 (six) hours as needed for Pain.  10 tablet 0      Counseling given: Not Answered  Comment: occasional cigars       REVIEW OF SYSTEMS:   Review of Systems   Constitutional: Negat prescriptions requested or ordered in this encounter       Health Maintenance:  Pneumococcal Vaccine: Birth to 64yrs(1 of 4 - PCV13) Never done  Annual Physical due on 08/04/2021  Influenza Vaccine(1) due on 10/01/2021    Patient/Caregiver Education: Kelsi Swan

## 2021-11-16 ENCOUNTER — TELEPHONE (OUTPATIENT)
Dept: FAMILY MEDICINE CLINIC | Facility: CLINIC | Age: 21
End: 2021-11-16

## 2021-11-16 NOTE — PROGRESS NOTES
Several attempts made to reach the patient with no return call. Patient completed HFU on 11/15/2021. Closing encounter.

## 2021-11-16 NOTE — TELEPHONE ENCOUNTER
Pt's mom called, Pt was diagnosed with lymphoma and Pt's mom would like to talk to a nurse regarding ordering labs. Pt's mom would like to speak with someone today if possible.  Thank you

## 2021-11-17 ENCOUNTER — LAB ENCOUNTER (OUTPATIENT)
Dept: LAB | Age: 21
End: 2021-11-17
Attending: SPECIALIST
Payer: COMMERCIAL

## 2021-11-17 ENCOUNTER — SOCIAL WORK SERVICES (OUTPATIENT)
Dept: HEMATOLOGY/ONCOLOGY | Facility: HOSPITAL | Age: 21
End: 2021-11-17

## 2021-11-17 ENCOUNTER — TELEPHONE (OUTPATIENT)
Dept: FAMILY MEDICINE CLINIC | Facility: CLINIC | Age: 21
End: 2021-11-17

## 2021-11-17 ENCOUNTER — OFFICE VISIT (OUTPATIENT)
Dept: HEMATOLOGY/ONCOLOGY | Age: 21
End: 2021-11-17
Attending: SPECIALIST
Payer: COMMERCIAL

## 2021-11-17 VITALS
WEIGHT: 181.88 LBS | HEIGHT: 71.26 IN | SYSTOLIC BLOOD PRESSURE: 113 MMHG | OXYGEN SATURATION: 98 % | HEART RATE: 102 BPM | RESPIRATION RATE: 18 BRPM | DIASTOLIC BLOOD PRESSURE: 75 MMHG | TEMPERATURE: 97 F | BODY MASS INDEX: 25.18 KG/M2

## 2021-11-17 DIAGNOSIS — C81.18 NODULAR SCLEROSIS HODGKIN LYMPHOMA OF LYMPH NODES OF MULTIPLE REGIONS (HCC): ICD-10-CM

## 2021-11-17 DIAGNOSIS — C81.18 NODULAR SCLEROSIS HODGKIN LYMPHOMA OF LYMPH NODES OF MULTIPLE REGIONS (HCC): Primary | ICD-10-CM

## 2021-11-17 PROCEDURE — 99215 OFFICE O/P EST HI 40 MIN: CPT | Performed by: SPECIALIST

## 2021-11-17 NOTE — PROGRESS NOTES
Patient is here today for post hospital follow up with Mary Jo Hodgson for Hodgkin's Lymphoma. Patient denies pain. Medication list and medical history were reviewed and updated.      Education Record    Learner:  Patient and parents    Disease / Diagnosis: Hodg

## 2021-11-17 NOTE — TELEPHONE ENCOUNTER
Lab results release to Reproductive Medicine via fax # [phone]. Success confirmation received. ALEKSEY was singed.

## 2021-11-17 NOTE — PROGRESS NOTES
spoke with Patient, Sandy Tinsley, and Alexander Kirby for introduction. Patient met with Dr Yaya Vásquez today to confirm treatment plan ([id number]).  Family had FMLA; FMLA for Mom and Dad completed and sent to Patient’s MyChart for them to submit to their emp

## 2021-11-18 ENCOUNTER — OFFICE VISIT (OUTPATIENT)
Dept: HEMATOLOGY/ONCOLOGY | Facility: HOSPITAL | Age: 21
End: 2021-11-18
Attending: THORACIC SURGERY (CARDIOTHORACIC VASCULAR SURGERY)
Payer: COMMERCIAL

## 2021-11-18 VITALS
HEART RATE: 80 BPM | DIASTOLIC BLOOD PRESSURE: 58 MMHG | WEIGHT: 181 LBS | RESPIRATION RATE: 16 BRPM | TEMPERATURE: 98 F | HEIGHT: 71.26 IN | OXYGEN SATURATION: 99 % | BODY MASS INDEX: 25.06 KG/M2 | SYSTOLIC BLOOD PRESSURE: 112 MMHG

## 2021-11-18 DIAGNOSIS — J90 PLEURAL EFFUSION: Primary | ICD-10-CM

## 2021-11-18 NOTE — PROGRESS NOTES
Thoracic Surgery Postoperative Note       Name: Sara Mancini   Age: 24year old   Sex: male.    MRN: [id number]    Subjective:     Chief Complaint: s/p VATS right pleural effusion, mediastinal lymph node dissection  Mr. Alhaji Villasenor is a 24year old male node dissection on 11/8/21 with final pathology revealing classical Hodgkin lymphoma, EBV positive. He is doing well. His pain is improving. Deep inspirations exacerbate his pain. He is trying to be more active. He denies fevers or chills. No cough.  No hem

## 2021-11-19 ENCOUNTER — TELEPHONE (OUTPATIENT)
Dept: HEMATOLOGY/ONCOLOGY | Age: 21
End: 2021-11-19

## 2021-11-19 ENCOUNTER — OFFICE VISIT (OUTPATIENT)
Dept: HEMATOLOGY/ONCOLOGY | Age: 21
End: 2021-11-19
Attending: CLINICAL NURSE SPECIALIST
Payer: COMMERCIAL

## 2021-11-19 DIAGNOSIS — Z71.9 ENCOUNTER FOR HEALTH EDUCATION: Primary | ICD-10-CM

## 2021-11-19 DIAGNOSIS — C81.18 NODULAR SCLEROSIS HODGKIN LYMPHOMA OF LYMPH NODES OF MULTIPLE REGIONS (HCC): ICD-10-CM

## 2021-11-19 PROCEDURE — 99417 PROLNG OP E/M EACH 15 MIN: CPT | Performed by: CLINICAL NURSE SPECIALIST

## 2021-11-19 PROCEDURE — 99215 OFFICE O/P EST HI 40 MIN: CPT | Performed by: CLINICAL NURSE SPECIALIST

## 2021-11-19 RX ORDER — PROCHLORPERAZINE MALEATE 10 MG
10 TABLET ORAL EVERY 6 HOURS PRN
Qty: 30 TABLET | Refills: 3 | Status: SHIPPED | OUTPATIENT
Start: 2021-11-19

## 2021-11-19 RX ORDER — ONDANSETRON HYDROCHLORIDE 8 MG/1
8 TABLET, FILM COATED ORAL EVERY 8 HOURS PRN
Qty: 30 TABLET | Refills: 3 | Status: SHIPPED | OUTPATIENT
Start: 2021-11-19

## 2021-11-19 NOTE — TELEPHONE ENCOUNTER
Cami Pinto from [employer] calling to discuss possible drug interaction between Seroquel XR 50 mg & Prochlorperazine

## 2021-11-19 NOTE — PROGRESS NOTES
Chemotherapy Education    Learner:  Patient and Family Member    Barriers / Limitations:  None    Chemotherapy education goals:  · Learn the drug names  · Administration schedule  · Routes of administration  · Treatment setting    Drug names:  ABVD: Adriam irregularity and vaginal dryness:  N/A    Notify MD/RN of any changes or problems:  Achieved    Comments: Scheduled for sperm banking     Treatment Effects on Emotional Status:    Potential mood changes, depression, nervousness, difficulty sleeping:  Achie

## 2021-11-20 ENCOUNTER — NURSE ONLY (OUTPATIENT)
Dept: RESPIRATORY THERAPY | Facility: HOSPITAL | Age: 21
End: 2021-11-20
Attending: SPECIALIST
Payer: COMMERCIAL

## 2021-11-20 ENCOUNTER — LAB ENCOUNTER (OUTPATIENT)
Dept: LAB | Facility: HOSPITAL | Age: 21
End: 2021-11-20
Attending: SPECIALIST
Payer: COMMERCIAL

## 2021-11-20 DIAGNOSIS — C81.18 NODULAR SCLEROSIS HODGKIN LYMPHOMA OF LYMPH NODES OF MULTIPLE REGIONS (HCC): ICD-10-CM

## 2021-11-20 DIAGNOSIS — C85.90 LYMPHOMA (HCC): ICD-10-CM

## 2021-11-20 PROCEDURE — 94729 DIFFUSING CAPACITY: CPT

## 2021-11-20 PROCEDURE — 94726 PLETHYSMOGRAPHY LUNG VOLUMES: CPT

## 2021-11-20 PROCEDURE — 94010 BREATHING CAPACITY TEST: CPT

## 2021-11-23 ENCOUNTER — TELEPHONE (OUTPATIENT)
Dept: HEMATOLOGY/ONCOLOGY | Facility: HOSPITAL | Age: 21
End: 2021-11-23

## 2021-11-23 RX ORDER — DOXORUBICIN HYDROCHLORIDE 2 MG/ML
25 INJECTION, SOLUTION INTRAVENOUS ONCE
Status: CANCELLED | OUTPATIENT
Start: 2021-11-26

## 2021-11-23 NOTE — TELEPHONE ENCOUNTER
Patient appointment - spoke with patient mother . .. we are aware patient ate prior to his port insertion and procedure was subsequently cancelled. Port is rescheduled for 11/24/2021 at [facility].    Mother also notified.  Chemotherapy appointment is changed t

## 2021-11-23 NOTE — TELEPHONE ENCOUNTER
Patient mother notified. Appointment with Abdifatah Barber is December 2nd at 4pm.      Stated understanding.

## 2021-11-24 ENCOUNTER — HOSPITAL ENCOUNTER (OUTPATIENT)
Dept: INTERVENTIONAL RADIOLOGY/VASCULAR | Facility: HOSPITAL | Age: 21
Discharge: HOME | End: 2021-11-24
Attending: SPECIALIST | Admitting: SPECIALIST
Payer: COMMERCIAL

## 2021-11-24 ENCOUNTER — APPOINTMENT (OUTPATIENT)
Dept: HEMATOLOGY/ONCOLOGY | Facility: HOSPITAL | Age: 21
End: 2021-11-24
Attending: CLINICAL NURSE SPECIALIST
Payer: COMMERCIAL

## 2021-11-24 ENCOUNTER — APPOINTMENT (OUTPATIENT)
Dept: HEMATOLOGY/ONCOLOGY | Age: 21
End: 2021-11-24
Attending: CLINICAL NURSE SPECIALIST
Payer: COMMERCIAL

## 2021-11-24 ENCOUNTER — APPOINTMENT (OUTPATIENT)
Dept: HEMATOLOGY/ONCOLOGY | Facility: HOSPITAL | Age: 21
End: 2021-11-24
Attending: SPECIALIST
Payer: COMMERCIAL

## 2021-11-24 ENCOUNTER — TELEPHONE (OUTPATIENT)
Dept: HEMATOLOGY/ONCOLOGY | Age: 21
End: 2021-11-24

## 2021-11-24 VITALS
RESPIRATION RATE: 20 BRPM | TEMPERATURE: 98 F | OXYGEN SATURATION: 98 % | DIASTOLIC BLOOD PRESSURE: 62 MMHG | HEART RATE: 110 BPM | SYSTOLIC BLOOD PRESSURE: 125 MMHG

## 2021-11-24 DIAGNOSIS — C81.18 NODULAR SCLEROSIS HODGKIN LYMPHOMA OF LYMPH NODES OF MULTIPLE REGIONS (HCC): ICD-10-CM

## 2021-11-24 PROCEDURE — 36561 INSERT TUNNELED CV CATH: CPT

## 2021-11-24 PROCEDURE — 99153 MOD SED SAME PHYS/QHP EA: CPT

## 2021-11-24 PROCEDURE — 77001 FLUOROGUIDE FOR VEIN DEVICE: CPT

## 2021-11-24 PROCEDURE — 99152 MOD SED SAME PHYS/QHP 5/>YRS: CPT

## 2021-11-24 PROCEDURE — 85610 PROTHROMBIN TIME: CPT

## 2021-11-24 PROCEDURE — 02HV33Z INSERTION OF INFUSION DEVICE INTO SUPERIOR VENA CAVA, PERCUTANEOUS APPROACH: ICD-10-PCS | Performed by: RADIOLOGY

## 2021-11-24 PROCEDURE — 76937 US GUIDE VASCULAR ACCESS: CPT

## 2021-11-24 RX ORDER — VANCOMYCIN HYDROCHLORIDE 1 G/20ML
INJECTION, POWDER, LYOPHILIZED, FOR SOLUTION INTRAVENOUS
Status: COMPLETED
Start: 2021-11-24 | End: 2021-11-24

## 2021-11-24 RX ORDER — LIDOCAINE HYDROCHLORIDE AND EPINEPHRINE 10; 10 MG/ML; UG/ML
INJECTION, SOLUTION INFILTRATION; PERINEURAL
Status: COMPLETED
Start: 2021-11-24 | End: 2021-11-24

## 2021-11-24 RX ORDER — MIDAZOLAM HYDROCHLORIDE 1 MG/ML
INJECTION INTRAMUSCULAR; INTRAVENOUS
Status: COMPLETED
Start: 2021-11-24 | End: 2021-11-24

## 2021-11-24 RX ORDER — LIDOCAINE HYDROCHLORIDE 10 MG/ML
INJECTION, SOLUTION INFILTRATION; PERINEURAL
Status: COMPLETED
Start: 2021-11-24 | End: 2021-11-24

## 2021-11-24 RX ORDER — SODIUM CHLORIDE 9 MG/ML
INJECTION, SOLUTION INTRAVENOUS CONTINUOUS
Status: DISCONTINUED | OUTPATIENT
Start: 2021-11-24 | End: 2021-11-24

## 2021-11-24 RX ORDER — CHLORHEXIDINE GLUCONATE 4 G/100ML
30 SOLUTION TOPICAL ONCE
Status: COMPLETED | OUTPATIENT
Start: 2021-11-24 | End: 2021-11-24

## 2021-11-24 RX ORDER — CEFAZOLIN SODIUM/WATER 2 G/20 ML
SYRINGE (ML) INTRAVENOUS
Status: COMPLETED
Start: 2021-11-24 | End: 2021-11-24

## 2021-11-24 RX ORDER — HEPARIN SODIUM 5000 [USP'U]/ML
INJECTION, SOLUTION INTRAVENOUS; SUBCUTANEOUS
Status: COMPLETED
Start: 2021-11-24 | End: 2021-11-24

## 2021-11-24 RX ADMIN — CHLORHEXIDINE GLUCONATE 30 ML: 4 SOLUTION TOPICAL at 14:45:00

## 2021-11-24 RX ADMIN — SODIUM CHLORIDE: 9 INJECTION, SOLUTION INTRAVENOUS at 14:45:00

## 2021-11-24 NOTE — TELEPHONE ENCOUNTER
Patient's mom called. Patient received a call to see a cardiologist before he has Chemo. Please call Zully to discuss.

## 2021-11-24 NOTE — TELEPHONE ENCOUNTER
Mother notified - per Merlin Bernard patient is to start his chemotherapy as scheduled on Friday 11/26 -- it is ok to see the cardiologist after his first treatment as previously discussed.       Mother stated understanding - she will inform her son (patient)

## 2021-11-24 NOTE — PROGRESS NOTES
Pt s/p port implant in IR. Pt recovered in holding. Right neck with padded tegaderm dressing, CDI, no bleeding or hematoma. Right chest dressing with mepilex dressing, CDI. No bleeding or hematoma. Pt mom at bedside. Pt eating and drinking.  Discharge instr

## 2021-11-26 ENCOUNTER — OFFICE VISIT (OUTPATIENT)
Dept: HEMATOLOGY/ONCOLOGY | Age: 21
End: 2021-11-26
Attending: CLINICAL NURSE SPECIALIST
Payer: COMMERCIAL

## 2021-11-26 VITALS
SYSTOLIC BLOOD PRESSURE: 148 MMHG | WEIGHT: 185 LBS | DIASTOLIC BLOOD PRESSURE: 77 MMHG | BODY MASS INDEX: 25.61 KG/M2 | OXYGEN SATURATION: 100 % | TEMPERATURE: 98 F | HEIGHT: 71.18 IN | HEART RATE: 110 BPM | RESPIRATION RATE: 16 BRPM

## 2021-11-26 DIAGNOSIS — C81.18 NODULAR SCLEROSIS HODGKIN LYMPHOMA OF LYMPH NODES OF MULTIPLE REGIONS (HCC): Primary | ICD-10-CM

## 2021-11-26 PROCEDURE — 80053 COMPREHEN METABOLIC PANEL: CPT

## 2021-11-26 PROCEDURE — 85025 COMPLETE CBC W/AUTO DIFF WBC: CPT

## 2021-11-26 PROCEDURE — 96367 TX/PROPH/DG ADDL SEQ IV INF: CPT

## 2021-11-26 PROCEDURE — 96413 CHEMO IV INFUSION 1 HR: CPT

## 2021-11-26 PROCEDURE — 96375 TX/PRO/DX INJ NEW DRUG ADDON: CPT

## 2021-11-26 PROCEDURE — 96411 CHEMO IV PUSH ADDL DRUG: CPT

## 2021-11-26 PROCEDURE — 96401 CHEMO ANTI-NEOPL SQ/IM: CPT

## 2021-11-26 RX ORDER — DOXORUBICIN HYDROCHLORIDE 2 MG/ML
25 INJECTION, SOLUTION INTRAVENOUS ONCE
Status: COMPLETED | OUTPATIENT
Start: 2021-11-26 | End: 2021-11-26

## 2021-11-26 RX ADMIN — DOXORUBICIN HYDROCHLORIDE 52 MG: 2 INJECTION, SOLUTION INTRAVENOUS at 10:38:00

## 2021-11-26 NOTE — PROGRESS NOTES
Pt here for C1D1. Arrives Ambulating independently, accompanied by Self          Modifications in dose or schedule: No     Frequency of blood return and site check throughout administration: Prior to administration and every few ml pushes.   Discharged to

## 2021-11-29 ENCOUNTER — TELEPHONE (OUTPATIENT)
Dept: FAMILY MEDICINE CLINIC | Facility: CLINIC | Age: 21
End: 2021-11-29

## 2021-11-29 ENCOUNTER — TELEPHONE (OUTPATIENT)
Dept: HEMATOLOGY/ONCOLOGY | Facility: HOSPITAL | Age: 21
End: 2021-11-29

## 2021-11-29 DIAGNOSIS — Z92.21 STATUS POST ADMINISTRATION OF CARDIOTOXIC CHEMOTHERAPY: Primary | ICD-10-CM

## 2021-11-29 NOTE — TELEPHONE ENCOUNTER
Toxicities: C1 D1 Doxorubicin/Bleomycin/Vinblastine/Dacarbazine on 11/26/2021    Zully reports Raina Merlos is doing well. No nausea or vomiting. He is eating and drinking well. He did complain that his mouth is \"a little\" sore today.  No redness or mouth ulcer

## 2021-11-29 NOTE — TELEPHONE ENCOUNTER
Pt's mother called requesting a referral for a cardiologist. Per a JANNETTE calix/ [facility] office they scheduled pt an OV w/ Mykel Novoa but it doesn't state why.  The APRN stated she contacted our office to place the referral. Did you ever receive anything for

## 2021-11-29 NOTE — TELEPHONE ENCOUNTER
Discussed w/ HARPER Springer. Pt is on cardiotoxic chemotherapy requiring close monitoring. Referral placed. I did notify pt's mother that the referral was placed.  I did tell her the appt date and time per 11/23 TE but encouraged her to call Mauro Luis

## 2021-11-29 NOTE — TELEPHONE ENCOUNTER
Spoke to pt's mother concerning call that there was purulent discharge from the port. Per pt's mother, drainage occurred after the port was placed but realized it was not purulent but more clear drainage.   Had chemo on Friday and per his mother, port is h

## 2021-11-29 NOTE — TELEPHONE ENCOUNTER
Pts [facility] was suppose to send a request for referral for cardiologist. States she was told pt has appt Thursday but she does not know with who.

## 2021-11-29 NOTE — TELEPHONE ENCOUNTER
Telephone    11/23/2021  [address] [facility], Neo Caballero, RN    Registered Nurse       Conversation  (Newest Message First)    Norma Delacruz RN     [facility]    11/23/21 10:01 AM  Note  Patient mother notified.  Appointment with Vi Doss

## 2021-12-01 ENCOUNTER — TELEPHONE (OUTPATIENT)
Dept: HEMATOLOGY/ONCOLOGY | Facility: HOSPITAL | Age: 21
End: 2021-12-01

## 2021-12-01 NOTE — TELEPHONE ENCOUNTER
Mother called Hilaria Smith has been having since treatment increased night sweats that needs bedding changes and interferes with sleep and seems to be getting worse. Was wondering what we could do to improve that.   Denies fevers, no sob, denies nausea, vomiting

## 2021-12-01 NOTE — TELEPHONE ENCOUNTER
Call and reassured them that the night sweats not abnormal and not much to do about them at this time but if any fevers he will need to come in. She verbalizes understanding.

## 2021-12-02 ENCOUNTER — TELEPHONE (OUTPATIENT)
Dept: HEMATOLOGY/ONCOLOGY | Facility: HOSPITAL | Age: 21
End: 2021-12-02

## 2021-12-02 NOTE — H&P
[facility]   History & Physical    Latricia Olvera Patient Status:  Outpatient    2000 MRN [id number]   [facility] No att. providers found   Hosp Day # 0 PCP Kerri Mejia MD     Admitting Diagnos bilat  Heart: RRR, S1, S2  Abdomen: Soft, NT/ND, BS+x4  Extremities: Warm, dry, no LE edema bilat  Pulses: 2+ bilat DP    Results:   Labs:  Recent Labs   Lab 11/26/21  0830   RBC 4.10*   HGB 11.1*   HCT 35.3*   MCV 86.1   MCH 27.1   MCHC 31.4   RDW 15.9

## 2021-12-02 NOTE — TELEPHONE ENCOUNTER
Left message for Yasmeen Hopkins to call back to discuss night sweats. SCAR      ----- Message from Cecelia Mckeon MD sent at 12/2/2021  6:24 AM CST -----  He called yesterday complaining of night sweats.  Can you call him and encourage him to come in and se

## 2021-12-03 ENCOUNTER — TELEPHONE (OUTPATIENT)
Dept: HEMATOLOGY/ONCOLOGY | Facility: HOSPITAL | Age: 21
End: 2021-12-03

## 2021-12-03 ENCOUNTER — APPOINTMENT (OUTPATIENT)
Dept: HEMATOLOGY/ONCOLOGY | Age: 21
End: 2021-12-03
Attending: CLINICAL NURSE SPECIALIST
Payer: COMMERCIAL

## 2021-12-03 NOTE — PROCEDURES
Findings:  FEV1 is 4.03L, 81% predicted. FVC is 4.96L, 84% predicted. FEV1/ FVC ratio is 0.81. The flow-volume loop demonstrates a normal pattern. The TLC is 7.63L, 101% predicted. The residual volume 2.64L, 154% predicted.   The diffusion capacity is

## 2021-12-03 NOTE — TELEPHONE ENCOUNTER
Left message for Bernette Boeck to CB. Spoke with mom: he is at cardiologist with his dad     He is struggling with hair loss and feeling like the treatments are not working.  She is encouraging him to seek counseling and asked if I would talk to him about this

## 2021-12-06 ENCOUNTER — TELEPHONE (OUTPATIENT)
Dept: HEMATOLOGY/ONCOLOGY | Facility: HOSPITAL | Age: 21
End: 2021-12-06

## 2021-12-06 ENCOUNTER — OFFICE VISIT (OUTPATIENT)
Dept: HEMATOLOGY/ONCOLOGY | Age: 21
End: 2021-12-06
Attending: CLINICAL NURSE SPECIALIST
Payer: COMMERCIAL

## 2021-12-06 ENCOUNTER — TELEPHONE (OUTPATIENT)
Dept: FAMILY MEDICINE CLINIC | Facility: CLINIC | Age: 21
End: 2021-12-06

## 2021-12-06 DIAGNOSIS — Z92.21 STATUS POST ADMINISTRATION OF CARDIOTOXIC CHEMOTHERAPY: Primary | ICD-10-CM

## 2021-12-06 DIAGNOSIS — T50.905A ADVERSE EFFECT OF DRUG, INITIAL ENCOUNTER: Primary | ICD-10-CM

## 2021-12-06 DIAGNOSIS — T45.1X5A CHEMOTHERAPY-INDUCED NEUTROPENIA (HCC): ICD-10-CM

## 2021-12-06 DIAGNOSIS — F33.1 MAJOR DEPRESSIVE DISORDER, RECURRENT, MODERATE (HCC): ICD-10-CM

## 2021-12-06 DIAGNOSIS — D70.1 CHEMOTHERAPY-INDUCED NEUTROPENIA (HCC): ICD-10-CM

## 2021-12-06 DIAGNOSIS — C81.18 NODULAR SCLEROSIS HODGKIN LYMPHOMA OF LYMPH NODES OF MULTIPLE REGIONS (HCC): ICD-10-CM

## 2021-12-06 DIAGNOSIS — R61 NIGHT SWEATS: ICD-10-CM

## 2021-12-06 DIAGNOSIS — F43.10 PTSD (POST-TRAUMATIC STRESS DISORDER): ICD-10-CM

## 2021-12-06 PROCEDURE — 84550 ASSAY OF BLOOD/URIC ACID: CPT

## 2021-12-06 PROCEDURE — 85025 COMPLETE CBC W/AUTO DIFF WBC: CPT

## 2021-12-06 PROCEDURE — 84100 ASSAY OF PHOSPHORUS: CPT

## 2021-12-06 PROCEDURE — 36591 DRAW BLOOD OFF VENOUS DEVICE: CPT

## 2021-12-06 PROCEDURE — 80053 COMPREHEN METABOLIC PANEL: CPT

## 2021-12-06 PROCEDURE — 83735 ASSAY OF MAGNESIUM: CPT

## 2021-12-06 PROCEDURE — 99214 OFFICE O/P EST MOD 30 MIN: CPT | Performed by: CLINICAL NURSE SPECIALIST

## 2021-12-06 RX ORDER — CITALOPRAM 20 MG/1
20 TABLET ORAL DAILY
Qty: 90 TABLET | Refills: 0 | Status: SHIPPED | OUTPATIENT
Start: 2021-12-06 | End: 2021-12-22

## 2021-12-06 NOTE — TELEPHONE ENCOUNTER
Left message for him to call me to be seen today. Requested CB.         ----- Message from Kathi Lr MD sent at 12/6/2021  6:22 AM CST -----  Can you call this patient again today?  I sent him a My Chart message on Friday with regard to his miss

## 2021-12-06 NOTE — TELEPHONE ENCOUNTER
Spoke with Tomasa Yao at [facility]. Inquired about cardio-oncologist that would be recommended for patient. Tomasa Yao states that message will be sent to Annika Conklin. Will wait for phone call back.

## 2021-12-06 NOTE — TELEPHONE ENCOUNTER
Spoke with Cora Campos at [facility]. Patient saw Jaspal Hairston M.D. on 12/3/2021. Ordered cardiac echo that the patient is scheduled for on 2/7/2022. Scheduled to follow up with Dr. Jaspal Hairston on 3/8/2021.     Confirmed that Dr. Jaspal Hairston is a cardio-o

## 2021-12-06 NOTE — TELEPHONE ENCOUNTER
Spoke with Gregory Butts. Recommended cardio-oncologist is Darryl Field. Cardio-oncology referral placed.

## 2021-12-06 NOTE — PROGRESS NOTES
ANP Visit Note    Patient Name: Caden Scruggs   YOB: 2000   Medical Record Number: [id number]   CSN: [id number]   Date of visit: 12/6/2021   Saima Patel MD   No primary care provider on file.      Chief Complaint/Reason for Visit:  Francois right     Pericardial effusion     Anemia, unspecified type     Elevated liver function tests     Elevated C-reactive protein (CRP)     Elevated sed rate     Major depressive disorder, recurrent, moderate (HCC)     PTSD (post-traumatic stress disorder) Needs: Not on file  Physical Activity: Not on file  Stress: Not on file  Social Connections: Not on file  Intimate Partner Violence: Not on file  Housing Stability: Not on file    Medications:    Current Outpatient Medications:   •  citalopram 20 MG Oral T Potassium 4.2 3.5 - 5.1 mmol/L    Chloride 105 98 - 112 mmol/L    CO2 30.0 21.0 - 32.0 mmol/L    Anion Gap 4 0 - 18 mmol/L    BUN 10 7 - 18 mg/dL    Creatinine 0.58 (L) 0.70 - 1.30 mg/dL    Calcium, Total 9.4 8.5 - 10.1 mg/dL    Calculated Osmolality 287 2 D15 on Thursday and will eventually get him to Wednesday next cycle to see Dr Hola Cook  2.  Anxiety: discussed his anxiety and will try Celexa, encouraged him to stay off of the internet and ask us his questions, I gave him the leukemia and lymphoma website

## 2021-12-06 NOTE — TELEPHONE ENCOUNTER
----- Message from Tawanna Todd MD sent at 12/6/2021 11:31 AM CST -----  Patient needs referral, see below message, I am ok with it.      Thank you  ----- Message -----  From: HARPER Barr  Sent: 11/29/2021  11:17 AM CST  To: MD Dr Richie Cruz

## 2021-12-07 RX ORDER — DOXORUBICIN HYDROCHLORIDE 2 MG/ML
25 INJECTION, SOLUTION INTRAVENOUS ONCE
Status: CANCELLED | OUTPATIENT
Start: 2021-12-09

## 2021-12-08 ENCOUNTER — APPOINTMENT (OUTPATIENT)
Dept: HEMATOLOGY/ONCOLOGY | Age: 21
End: 2021-12-08
Attending: CLINICAL NURSE SPECIALIST
Payer: COMMERCIAL

## 2021-12-09 ENCOUNTER — OFFICE VISIT (OUTPATIENT)
Dept: HEMATOLOGY/ONCOLOGY | Age: 21
End: 2021-12-09
Attending: CLINICAL NURSE SPECIALIST
Payer: COMMERCIAL

## 2021-12-09 VITALS
HEART RATE: 85 BPM | BODY MASS INDEX: 26.17 KG/M2 | SYSTOLIC BLOOD PRESSURE: 119 MMHG | OXYGEN SATURATION: 98 % | DIASTOLIC BLOOD PRESSURE: 75 MMHG | HEIGHT: 71.18 IN | WEIGHT: 189 LBS | RESPIRATION RATE: 16 BRPM | TEMPERATURE: 97 F

## 2021-12-09 DIAGNOSIS — F33.1 MAJOR DEPRESSIVE DISORDER, RECURRENT, MODERATE (HCC): ICD-10-CM

## 2021-12-09 DIAGNOSIS — D75.839 THROMBOCYTOSIS: ICD-10-CM

## 2021-12-09 DIAGNOSIS — T45.1X5A CHEMOTHERAPY INDUCED NEUTROPENIA (HCC): ICD-10-CM

## 2021-12-09 DIAGNOSIS — D64.81 ANTINEOPLASTIC CHEMOTHERAPY INDUCED ANEMIA: ICD-10-CM

## 2021-12-09 DIAGNOSIS — Z51.11 ENCOUNTER FOR CHEMOTHERAPY MANAGEMENT: Primary | ICD-10-CM

## 2021-12-09 DIAGNOSIS — D70.1 CHEMOTHERAPY INDUCED NEUTROPENIA (HCC): ICD-10-CM

## 2021-12-09 DIAGNOSIS — C81.18 NODULAR SCLEROSIS HODGKIN LYMPHOMA OF LYMPH NODES OF MULTIPLE REGIONS (HCC): Primary | ICD-10-CM

## 2021-12-09 DIAGNOSIS — J02.9 SORE THROAT: ICD-10-CM

## 2021-12-09 DIAGNOSIS — T45.1X5A ANTINEOPLASTIC CHEMOTHERAPY INDUCED ANEMIA: ICD-10-CM

## 2021-12-09 DIAGNOSIS — C81.18 NODULAR SCLEROSIS HODGKIN LYMPHOMA OF LYMPH NODES OF MULTIPLE REGIONS (HCC): ICD-10-CM

## 2021-12-09 PROCEDURE — 96413 CHEMO IV INFUSION 1 HR: CPT

## 2021-12-09 PROCEDURE — 80053 COMPREHEN METABOLIC PANEL: CPT

## 2021-12-09 PROCEDURE — 96375 TX/PRO/DX INJ NEW DRUG ADDON: CPT

## 2021-12-09 PROCEDURE — 96411 CHEMO IV PUSH ADDL DRUG: CPT

## 2021-12-09 PROCEDURE — 99215 OFFICE O/P EST HI 40 MIN: CPT | Performed by: CLINICAL NURSE SPECIALIST

## 2021-12-09 PROCEDURE — 85025 COMPLETE CBC W/AUTO DIFF WBC: CPT

## 2021-12-09 PROCEDURE — 96367 TX/PROPH/DG ADDL SEQ IV INF: CPT

## 2021-12-09 RX ORDER — DOXORUBICIN HYDROCHLORIDE 2 MG/ML
25 INJECTION, SOLUTION INTRAVENOUS ONCE
Status: COMPLETED | OUTPATIENT
Start: 2021-12-09 | End: 2021-12-09

## 2021-12-09 RX ORDER — AMOXICILLIN AND CLAVULANATE POTASSIUM 875; 125 MG/1; MG/1
1 TABLET, FILM COATED ORAL 2 TIMES DAILY
Qty: 20 TABLET | Refills: 0 | Status: SHIPPED | OUTPATIENT
Start: 2021-12-09 | End: 2022-01-19 | Stop reason: ALTCHOICE

## 2021-12-09 RX ADMIN — DOXORUBICIN HYDROCHLORIDE 52 MG: 2 INJECTION, SOLUTION INTRAVENOUS at 11:19:00

## 2021-12-09 NOTE — PROGRESS NOTES
Patient is here for follow up and C1D15 of [facility]. He reports that he is feeling well. He denies nausea. He had 3-4 days of constipation following his first treatment. This was relieved with a chewable pill he doesn't remember the name of.     He has good

## 2021-12-09 NOTE — PROGRESS NOTES
Pt here for C1D15.   Arrives Ambulating independently, accompanied by Self           Pregnancy screening: Not applicable    Modifications in dose or schedule: No     Frequency of blood return and site check throughout administration: Prior to administration

## 2021-12-09 NOTE — PROGRESS NOTES
ANP Visit Note    Patient Name: Lex Spivey   YOB: 2000   Medical Record Number: [id number]   CSN: [id number]   Date of visit: 12/9/2021   Mauro Thornton MD   No primary care provider on file.      Chief Complaint/Reason for Visit:  Bailey Burleson History:  Social History    Socioeconomic History      Marital status: Single      Spouse name: Not on file      Number of children: Not on file      Years of education: Not on file      Highest education level: Not on file    Occupational History      Not HPI.    Performance Status: ECOG 1    Physical Examination:  General: Patient is alert and oriented x 3, not in acute distress. Vital Signs:    Oncology Vitals 12/9/2021   Height 5' 11.181\"   Height 181 cm   Weight 189 lb   Weight 85.73 kg   BSA (m2) 2.06 Value Ref Range    WBC 1.9 (L) 4.0 - 11.0 x10(3) uL    RBC 3.81 (L) 4.30 - 5.70 x10(6)uL    HGB 10.5 (L) 13.0 - 17.5 g/dL    HCT 32.6 (L) 39.0 - 53.0 %    .0 150.0 - 450.0 10(3)uL    MCV 85.6 80.0 - 100.0 fL    MCH 27.6 26.0 - 34.0 pg    MCHC 32.2 3 g/dL    HCT 38.0 (L) 39.0 - 53.0 %    .0 (H) 150.0 - 450.0 10(3)uL    MCV 87.2 80.0 - 100.0 fL    MCH 27.8 26.0 - 34.0 pg    MCHC 31.8 31.0 - 37.0 g/dL    RDW 17.1 %    Neutrophil Absolute Prelim 0.27 (LL) 1.50 - 7.70 x10 (3) uL    Neutrophil Absolu well-being and any concerns about anxiety or depression. We discussed issues of distress, coping difficulties and social support systems and currently there are no active problems.     Planned Follow Up:12/22 [facility], Dr Orquidea Ching, Chemo Cycle 2     Risk Level:

## 2021-12-21 RX ORDER — DOXORUBICIN HYDROCHLORIDE 2 MG/ML
25 INJECTION, SOLUTION INTRAVENOUS ONCE
Status: CANCELLED | OUTPATIENT
Start: 2021-12-22

## 2021-12-22 ENCOUNTER — OFFICE VISIT (OUTPATIENT)
Dept: HEMATOLOGY/ONCOLOGY | Age: 21
End: 2021-12-22
Attending: CLINICAL NURSE SPECIALIST
Payer: COMMERCIAL

## 2021-12-22 VITALS
OXYGEN SATURATION: 99 % | SYSTOLIC BLOOD PRESSURE: 139 MMHG | BODY MASS INDEX: 27.41 KG/M2 | DIASTOLIC BLOOD PRESSURE: 69 MMHG | RESPIRATION RATE: 18 BRPM | WEIGHT: 198 LBS | HEIGHT: 71.18 IN | HEART RATE: 73 BPM | TEMPERATURE: 98 F

## 2021-12-22 DIAGNOSIS — Z51.11 ENCOUNTER FOR CHEMOTHERAPY MANAGEMENT: ICD-10-CM

## 2021-12-22 DIAGNOSIS — C81.18 NODULAR SCLEROSIS HODGKIN LYMPHOMA OF LYMPH NODES OF MULTIPLE REGIONS (HCC): Primary | ICD-10-CM

## 2021-12-22 DIAGNOSIS — Z51.11 CHEMOTHERAPY MANAGEMENT, ENCOUNTER FOR: ICD-10-CM

## 2021-12-22 PROCEDURE — 80053 COMPREHEN METABOLIC PANEL: CPT

## 2021-12-22 PROCEDURE — 96411 CHEMO IV PUSH ADDL DRUG: CPT

## 2021-12-22 PROCEDURE — 85652 RBC SED RATE AUTOMATED: CPT

## 2021-12-22 PROCEDURE — 99215 OFFICE O/P EST HI 40 MIN: CPT | Performed by: SPECIALIST

## 2021-12-22 PROCEDURE — 96375 TX/PRO/DX INJ NEW DRUG ADDON: CPT

## 2021-12-22 PROCEDURE — 96367 TX/PROPH/DG ADDL SEQ IV INF: CPT

## 2021-12-22 PROCEDURE — 85025 COMPLETE CBC W/AUTO DIFF WBC: CPT

## 2021-12-22 PROCEDURE — 84550 ASSAY OF BLOOD/URIC ACID: CPT

## 2021-12-22 PROCEDURE — 96413 CHEMO IV INFUSION 1 HR: CPT

## 2021-12-22 RX ORDER — ALLOPURINOL 300 MG/1
300 TABLET ORAL DAILY
Qty: 30 TABLET | Refills: 0 | Status: SHIPPED | OUTPATIENT
Start: 2021-12-22

## 2021-12-22 RX ORDER — DOXORUBICIN HYDROCHLORIDE 2 MG/ML
25 INJECTION, SOLUTION INTRAVENOUS ONCE
Status: COMPLETED | OUTPATIENT
Start: 2021-12-22 | End: 2021-12-22

## 2021-12-22 RX ADMIN — DOXORUBICIN HYDROCHLORIDE 52 MG: 2 INJECTION, SOLUTION INTRAVENOUS at 12:46:00

## 2021-12-22 NOTE — PROGRESS NOTES
Patient is here today for follow up with Ramos Caba for Hodgkins Lymphoma C2D1 ABVD. Patient denies pain. Stated fatigue is improving. Nausea post treatment controlled with antiemetics. Appetite is good. No cough or shortness of breath.   Medication list,

## 2021-12-22 NOTE — PROGRESS NOTES
Pt here for C2D1 ABVD.   Arrives Ambulating independently, accompanied by Self           Pregnancy screening: Not applicable    Modifications in dose or schedule: No     Frequency of blood return and site check throughout administration: Prior to Sandra Cooper

## 2021-12-22 NOTE — PROGRESS NOTES
THE [facility] Hematology Oncology Group Progress Note      Patient Name: Jin Burch   YOB: 2000  Medical Record Number: [id number]  Attending Physician: Fanny Colindres M.D.      Date of Visit: 12/22/2021      Chief Complaint  Hodgkin lympho History (historical data, reviewed by physician)  None. Family History (historical data, reviewed by physician)  No known family history of malignancy. Social History (historical data, reviewed by physician)  Denies tobacco use.      Current Medicatio Collection Time: 12/22/21 10:13 AM   Result Value Ref Range    Glucose 109 (H) 70 - 99 mg/dL    Sodium 140 136 - 145 mmol/L    Potassium 3.6 3.5 - 5.1 mmol/L    Chloride 107 98 - 112 mmol/L    CO2 27.0 21.0 - 32.0 mmol/L    Anion Gap 6 0 - 18 mmol/L    BUN to schedule PET scan at the end of this cycle. Planned Follow Up  Patient will return for follow up and treatment in 2 weeks. Electronically signed by:    Josseline Brooks M.D.   Associate Medical Director of [address] [place] [address]

## 2021-12-23 ENCOUNTER — TELEPHONE (OUTPATIENT)
Dept: HEMATOLOGY/ONCOLOGY | Facility: HOSPITAL | Age: 21
End: 2021-12-23

## 2021-12-23 NOTE — TELEPHONE ENCOUNTER
MD Charo Alegria, RN  Please call patient. Let him know that his uric acid is a little high. This happens when we kill lymphoma cells. I want him to start allopurinol 300 mg daily to bring it down. This will protect his kidneys.  I s

## 2021-12-23 NOTE — TELEPHONE ENCOUNTER
MD Marlys Enriquez, RN  Please call patient. Let him know that his uric acid is a little high. This happens when we kill lymphoma cells. I want him to start allopurinol 300 mg daily to bring it down. This will protect his kidneys.  I s

## 2021-12-30 ENCOUNTER — TELEPHONE (OUTPATIENT)
Dept: HEMATOLOGY/ONCOLOGY | Facility: HOSPITAL | Age: 21
End: 2021-12-30

## 2021-12-30 DIAGNOSIS — Z20.822 COUGH WITH EXPOSURE TO COVID-19 VIRUS: Primary | ICD-10-CM

## 2021-12-30 DIAGNOSIS — C81.18 NODULAR SCLEROSIS HODGKIN LYMPHOMA OF LYMPH NODES OF MULTIPLE REGIONS (HCC): ICD-10-CM

## 2021-12-30 DIAGNOSIS — R05.8 COUGH WITH EXPOSURE TO COVID-19 VIRUS: Primary | ICD-10-CM

## 2021-12-30 NOTE — TELEPHONE ENCOUNTER
Spoke with mother - patient sister has COVID 23 - Positive PCR test.     Patient was in close contact with sister and has a now has a cough - patient is on chemotherapy treatment. Per Nola Marks APN - order placed for COVID testing.   Patient / Mother to

## 2021-12-31 ENCOUNTER — LAB ENCOUNTER (OUTPATIENT)
Dept: LAB | Age: 21
End: 2021-12-31
Attending: CLINICAL NURSE SPECIALIST
Payer: COMMERCIAL

## 2021-12-31 DIAGNOSIS — C81.18 NODULAR SCLEROSIS HODGKIN LYMPHOMA OF LYMPH NODES OF MULTIPLE REGIONS (HCC): ICD-10-CM

## 2021-12-31 DIAGNOSIS — R05.8 COUGH WITH EXPOSURE TO COVID-19 VIRUS: ICD-10-CM

## 2021-12-31 DIAGNOSIS — Z20.822 COUGH WITH EXPOSURE TO COVID-19 VIRUS: ICD-10-CM

## 2022-01-02 LAB — SARS-COV-2 RNA RESP QL NAA+PROBE: NOT DETECTED

## 2022-01-04 RX ORDER — DOXORUBICIN HYDROCHLORIDE 2 MG/ML
25 INJECTION, SOLUTION INTRAVENOUS ONCE
Status: CANCELLED | OUTPATIENT
Start: 2022-01-05

## 2022-01-04 NOTE — PROGRESS NOTES
[facility] Progress Note      Patient Name: Benson Rodriguez   YOB: 2000  Medical Record Number: [id number]  Attending Physician: Madai Atkinson M.D.      Date of Visit: 1/5/2022      Chief Complaint  Hodgkin lymphoma malignancy. Social History (historical data, reviewed by physician)  Denies tobacco use. Current Medications  allopurinol 300 MG Oral Tab, Take 1 tablet (300 mg total) by mouth daily. , Disp: 30 tablet, Rfl: 0  ondansetron (ZOFRAN) 8 MG tablet, Take 98 - 112 mmol/L    CO2 28.0 21.0 - 32.0 mmol/L    Anion Gap 6 0 - 18 mmol/L    BUN 17 7 - 18 mg/dL    Creatinine 0.73 0.70 - 1.30 mg/dL    Calcium, Total 9.5 8.5 - 10.1 mg/dL    Calculated Osmolality 291 275 - 295 mOsm/kg    GFR, Non- 132 > [place]

## 2022-01-05 ENCOUNTER — OFFICE VISIT (OUTPATIENT)
Dept: HEMATOLOGY/ONCOLOGY | Age: 22
End: 2022-01-05
Attending: CLINICAL NURSE SPECIALIST
Payer: COMMERCIAL

## 2022-01-05 VITALS
RESPIRATION RATE: 18 BRPM | BODY MASS INDEX: 28.66 KG/M2 | OXYGEN SATURATION: 99 % | TEMPERATURE: 98 F | SYSTOLIC BLOOD PRESSURE: 148 MMHG | HEIGHT: 71.18 IN | HEART RATE: 65 BPM | DIASTOLIC BLOOD PRESSURE: 81 MMHG | WEIGHT: 207 LBS

## 2022-01-05 DIAGNOSIS — Z51.11 CHEMOTHERAPY MANAGEMENT, ENCOUNTER FOR: ICD-10-CM

## 2022-01-05 DIAGNOSIS — E79.0 HYPERURICEMIA: ICD-10-CM

## 2022-01-05 DIAGNOSIS — Z51.11 ENCOUNTER FOR CHEMOTHERAPY MANAGEMENT: ICD-10-CM

## 2022-01-05 DIAGNOSIS — C81.18 NODULAR SCLEROSIS HODGKIN LYMPHOMA OF LYMPH NODES OF MULTIPLE REGIONS (HCC): Primary | ICD-10-CM

## 2022-01-05 LAB
ANION GAP SERPL CALC-SCNC: 6 MMOL/L (ref 0–18)
BASOPHILS # BLD AUTO: 0.07 X10(3) UL (ref 0–0.2)
BASOPHILS NFR BLD AUTO: 2.2 %
BUN BLD-MCNC: 17 MG/DL (ref 7–18)
CALCIUM BLD-MCNC: 9.5 MG/DL (ref 8.5–10.1)
CHLORIDE SERPL-SCNC: 106 MMOL/L (ref 98–112)
CO2 SERPL-SCNC: 28 MMOL/L (ref 21–32)
CREAT BLD-MCNC: 0.73 MG/DL
EOSINOPHIL # BLD AUTO: 0.18 X10(3) UL (ref 0–0.7)
EOSINOPHIL NFR BLD AUTO: 5.8 %
ERYTHROCYTE [DISTWIDTH] IN BLOOD BY AUTOMATED COUNT: 16.9 %
FASTING STATUS PATIENT QL REPORTED: NO
GLUCOSE BLD-MCNC: 93 MG/DL (ref 70–99)
HCT VFR BLD AUTO: 40.5 %
HGB BLD-MCNC: 13.4 G/DL
IMM GRANULOCYTES # BLD AUTO: 0.06 X10(3) UL (ref 0–1)
IMM GRANULOCYTES NFR BLD: 1.9 %
LYMPHOCYTES # BLD AUTO: 1.46 X10(3) UL (ref 1–4)
LYMPHOCYTES NFR BLD AUTO: 46.6 %
MCH RBC QN AUTO: 28.3 PG (ref 26–34)
MCHC RBC AUTO-ENTMCNC: 33.1 G/DL (ref 31–37)
MCV RBC AUTO: 85.6 FL
MONOCYTES # BLD AUTO: 0.48 X10(3) UL (ref 0.1–1)
MONOCYTES NFR BLD AUTO: 15.3 %
NEUTROPHILS # BLD AUTO: 0.88 X10 (3) UL (ref 1.5–7.7)
NEUTROPHILS # BLD AUTO: 0.88 X10(3) UL (ref 1.5–7.7)
NEUTROPHILS NFR BLD AUTO: 28.2 %
OSMOLALITY SERPL CALC.SUM OF ELEC: 291 MOSM/KG (ref 275–295)
PLATELET # BLD AUTO: 280 10(3)UL (ref 150–450)
POTASSIUM SERPL-SCNC: 3.7 MMOL/L (ref 3.5–5.1)
RBC # BLD AUTO: 4.73 X10(6)UL
SODIUM SERPL-SCNC: 140 MMOL/L (ref 136–145)
URATE SERPL-MCNC: 5.8 MG/DL
WBC # BLD AUTO: 3.1 X10(3) UL (ref 4–11)

## 2022-01-05 PROCEDURE — 96411 CHEMO IV PUSH ADDL DRUG: CPT

## 2022-01-05 PROCEDURE — 96413 CHEMO IV INFUSION 1 HR: CPT

## 2022-01-05 PROCEDURE — 80048 BASIC METABOLIC PNL TOTAL CA: CPT

## 2022-01-05 PROCEDURE — 84550 ASSAY OF BLOOD/URIC ACID: CPT

## 2022-01-05 PROCEDURE — 99215 OFFICE O/P EST HI 40 MIN: CPT | Performed by: SPECIALIST

## 2022-01-05 PROCEDURE — 96367 TX/PROPH/DG ADDL SEQ IV INF: CPT

## 2022-01-05 PROCEDURE — 85025 COMPLETE CBC W/AUTO DIFF WBC: CPT

## 2022-01-05 PROCEDURE — 96375 TX/PRO/DX INJ NEW DRUG ADDON: CPT

## 2022-01-05 RX ORDER — DOXORUBICIN HYDROCHLORIDE 2 MG/ML
25 INJECTION, SOLUTION INTRAVENOUS ONCE
Status: COMPLETED | OUTPATIENT
Start: 2022-01-05 | End: 2022-01-05

## 2022-01-05 RX ADMIN — DOXORUBICIN HYDROCHLORIDE 52 MG: 2 INJECTION, SOLUTION INTRAVENOUS at 13:43:00

## 2022-01-05 NOTE — PROGRESS NOTES
Pt here for C2D15.   Arrives Ambulating independently, accompanied by Self           Pregnancy screening: Not applicable    Modifications in dose or schedule: No     Frequency of blood return and site check throughout administration: Prior to administration

## 2022-01-05 NOTE — PROGRESS NOTES
Patient is here today for follow up with Hayden Punches for Hodgkins Lymphoma. ABVD C2D15. Patient denies pain. Stated he is feeling good. Denied fatigue, appetite is good. Nausea post chemo post chemo is controlled with antiemetics.  No shortness of breath or

## 2022-01-06 ENCOUNTER — TELEPHONE (OUTPATIENT)
Dept: HEMATOLOGY/ONCOLOGY | Facility: HOSPITAL | Age: 22
End: 2022-01-06

## 2022-01-06 NOTE — TELEPHONE ENCOUNTER
MD Razia Bach RN  Let him know that his uric acid has normalized. However, I want him to stay on allopurinol until his next visit in order to drive the number a bit lower still. Patient notified. Repeated instruction.

## 2022-01-17 ENCOUNTER — HOSPITAL ENCOUNTER (OUTPATIENT)
Dept: NUCLEAR MEDICINE | Facility: HOSPITAL | Age: 22
Discharge: HOME | End: 2022-01-17
Attending: SPECIALIST
Payer: COMMERCIAL

## 2022-01-17 DIAGNOSIS — C81.18 NODULAR SCLEROSIS HODGKIN LYMPHOMA OF LYMPH NODES OF MULTIPLE REGIONS (HCC): ICD-10-CM

## 2022-01-17 LAB — GLUCOSE BLD-MCNC: 101 MG/DL (ref 70–99)

## 2022-01-17 PROCEDURE — 78815 PET IMAGE W/CT SKULL-THIGH: CPT | Performed by: SPECIALIST

## 2022-01-17 PROCEDURE — 82962 GLUCOSE BLOOD TEST: CPT

## 2022-01-18 NOTE — PROGRESS NOTES
THE [facility]ematology Oncology Group Progress Note      Patient Name: Wally Manley   YOB: 2000  Medical Record Number: [id number]  Attending Physician: Roberto Lathma M.D.      Date of Visit: 1/19/2022      Chief Complaint  Hodgkin lymphom lymphoma (as above). Past Surgical History (historical data, reviewed by physician)  None. Family History (historical data, reviewed by physician)  No known family history of malignancy.     Social History (historical data, reviewed by physician)  Arya Patel Impression and Plan   1. Hodgkin lymphoma, nodular sclerosis type: Patient with classical Hodgkin's lymphoma. He is staged as bulky stage IIB. PET scan shows complete remission with no abnormal uptake on PET scan.  Recommend completing 4 more c

## 2022-01-19 ENCOUNTER — OFFICE VISIT (OUTPATIENT)
Dept: HEMATOLOGY/ONCOLOGY | Age: 22
End: 2022-01-19
Attending: CLINICAL NURSE SPECIALIST
Payer: COMMERCIAL

## 2022-01-19 VITALS
DIASTOLIC BLOOD PRESSURE: 62 MMHG | HEART RATE: 73 BPM | SYSTOLIC BLOOD PRESSURE: 142 MMHG | RESPIRATION RATE: 16 BRPM | WEIGHT: 213 LBS | HEIGHT: 71.18 IN | OXYGEN SATURATION: 99 % | BODY MASS INDEX: 29.49 KG/M2 | TEMPERATURE: 97 F

## 2022-01-19 DIAGNOSIS — C81.18 NODULAR SCLEROSIS HODGKIN LYMPHOMA OF LYMPH NODES OF MULTIPLE REGIONS (HCC): Primary | ICD-10-CM

## 2022-01-19 DIAGNOSIS — Z51.11 CHEMOTHERAPY MANAGEMENT, ENCOUNTER FOR: ICD-10-CM

## 2022-01-19 LAB
ALBUMIN SERPL-MCNC: 3.7 G/DL (ref 3.4–5)
ALBUMIN/GLOB SERPL: 1 {RATIO} (ref 1–2)
ALP LIVER SERPL-CCNC: 50 U/L
ALT SERPL-CCNC: 47 U/L
ANION GAP SERPL CALC-SCNC: 6 MMOL/L (ref 0–18)
AST SERPL-CCNC: 24 U/L (ref 15–37)
BASOPHILS # BLD AUTO: 0.05 X10(3) UL (ref 0–0.2)
BASOPHILS NFR BLD AUTO: 1.5 %
BILIRUB SERPL-MCNC: 0.6 MG/DL (ref 0.1–2)
BUN BLD-MCNC: 8 MG/DL (ref 7–18)
CALCIUM BLD-MCNC: 9.3 MG/DL (ref 8.5–10.1)
CHLORIDE SERPL-SCNC: 106 MMOL/L (ref 98–112)
CO2 SERPL-SCNC: 27 MMOL/L (ref 21–32)
CREAT BLD-MCNC: 0.75 MG/DL
EOSINOPHIL # BLD AUTO: 0.21 X10(3) UL (ref 0–0.7)
EOSINOPHIL NFR BLD AUTO: 6.3 %
ERYTHROCYTE [DISTWIDTH] IN BLOOD BY AUTOMATED COUNT: 16.2 %
ERYTHROCYTE [SEDIMENTATION RATE] IN BLOOD: 7 MM/HR
GLOBULIN PLAS-MCNC: 3.8 G/DL (ref 2.8–4.4)
GLUCOSE BLD-MCNC: 95 MG/DL (ref 70–99)
HCT VFR BLD AUTO: 41.5 %
HGB BLD-MCNC: 14 G/DL
IMM GRANULOCYTES # BLD AUTO: 0.02 X10(3) UL (ref 0–1)
IMM GRANULOCYTES NFR BLD: 0.6 %
LYMPHOCYTES # BLD AUTO: 1.54 X10(3) UL (ref 1–4)
LYMPHOCYTES NFR BLD AUTO: 46.4 %
MCH RBC QN AUTO: 28.6 PG (ref 26–34)
MCHC RBC AUTO-ENTMCNC: 33.7 G/DL (ref 31–37)
MCV RBC AUTO: 84.9 FL
MONOCYTES # BLD AUTO: 0.45 X10(3) UL (ref 0.1–1)
MONOCYTES NFR BLD AUTO: 13.6 %
NEUTROPHILS # BLD AUTO: 1.05 X10 (3) UL (ref 1.5–7.7)
NEUTROPHILS # BLD AUTO: 1.05 X10(3) UL (ref 1.5–7.7)
NEUTROPHILS NFR BLD AUTO: 31.6 %
OSMOLALITY SERPL CALC.SUM OF ELEC: 286 MOSM/KG (ref 275–295)
PLATELET # BLD AUTO: 253 10(3)UL (ref 150–450)
POTASSIUM SERPL-SCNC: 3.8 MMOL/L (ref 3.5–5.1)
PROT SERPL-MCNC: 7.5 G/DL (ref 6.4–8.2)
RBC # BLD AUTO: 4.89 X10(6)UL
SODIUM SERPL-SCNC: 139 MMOL/L (ref 136–145)
URATE SERPL-MCNC: 6.6 MG/DL
WBC # BLD AUTO: 3.3 X10(3) UL (ref 4–11)

## 2022-01-19 PROCEDURE — 96375 TX/PRO/DX INJ NEW DRUG ADDON: CPT

## 2022-01-19 PROCEDURE — 80053 COMPREHEN METABOLIC PANEL: CPT

## 2022-01-19 PROCEDURE — 96413 CHEMO IV INFUSION 1 HR: CPT

## 2022-01-19 PROCEDURE — 99215 OFFICE O/P EST HI 40 MIN: CPT | Performed by: SPECIALIST

## 2022-01-19 PROCEDURE — 96411 CHEMO IV PUSH ADDL DRUG: CPT

## 2022-01-19 PROCEDURE — 96367 TX/PROPH/DG ADDL SEQ IV INF: CPT

## 2022-01-19 PROCEDURE — 85652 RBC SED RATE AUTOMATED: CPT

## 2022-01-19 PROCEDURE — 84550 ASSAY OF BLOOD/URIC ACID: CPT

## 2022-01-19 PROCEDURE — 85025 COMPLETE CBC W/AUTO DIFF WBC: CPT

## 2022-01-19 RX ORDER — PROCHLORPERAZINE MALEATE 10 MG
10 TABLET ORAL ONCE
Status: COMPLETED | OUTPATIENT
Start: 2022-01-19 | End: 2022-01-19

## 2022-01-19 RX ORDER — DOXORUBICIN HYDROCHLORIDE 2 MG/ML
25 INJECTION, SOLUTION INTRAVENOUS ONCE
Status: COMPLETED | OUTPATIENT
Start: 2022-01-19 | End: 2022-01-19

## 2022-01-19 RX ORDER — PROCHLORPERAZINE MALEATE 10 MG
10 TABLET ORAL ONCE
Status: CANCELLED
Start: 2022-01-19 | End: 2022-01-19

## 2022-01-19 RX ORDER — DOXORUBICIN HYDROCHLORIDE 2 MG/ML
25 INJECTION, SOLUTION INTRAVENOUS ONCE
Status: CANCELLED | OUTPATIENT
Start: 2022-02-02

## 2022-01-19 RX ORDER — DOXORUBICIN HYDROCHLORIDE 2 MG/ML
25 INJECTION, SOLUTION INTRAVENOUS ONCE
Status: CANCELLED | OUTPATIENT
Start: 2022-01-19

## 2022-01-19 RX ADMIN — DOXORUBICIN HYDROCHLORIDE 52 MG: 2 INJECTION, SOLUTION INTRAVENOUS at 15:14:00

## 2022-01-19 RX ADMIN — PROCHLORPERAZINE MALEATE 10 MG: 10 MG TABLET ORAL at 14:27:00

## 2022-01-19 NOTE — PROGRESS NOTES
Pt here for C3D1.   Arrives Ambulating independently, accompanied by Self           Pregnancy screening: Not applicable    Modifications in dose or schedule: Yes, no bleomycin      Frequency of blood return and site check throughout administration: Prior to

## 2022-01-19 NOTE — PROGRESS NOTES
Patient is here today for follow up with [facility] for Hodgkins Lymphoma C5D1 ABVD. Patient denies pain. Stated fatigue. Nausea days 1-5 post treatment - antiemetics not much help - no emesis. Taste changes.  Tingling in finger tips post treatment than res

## 2022-01-20 ENCOUNTER — TELEPHONE (OUTPATIENT)
Dept: HEMATOLOGY/ONCOLOGY | Facility: HOSPITAL | Age: 22
End: 2022-01-20

## 2022-01-20 NOTE — TELEPHONE ENCOUNTER
MD Janel Nelson RN  His uric acid is normal. He can stop allopurinol. Patient notified. He will stop allopurinol.

## 2022-02-02 ENCOUNTER — OFFICE VISIT (OUTPATIENT)
Dept: HEMATOLOGY/ONCOLOGY | Age: 22
End: 2022-02-02
Attending: CLINICAL NURSE SPECIALIST
Payer: COMMERCIAL

## 2022-02-02 DIAGNOSIS — C81.18 NODULAR SCLEROSIS HODGKIN LYMPHOMA OF LYMPH NODES OF MULTIPLE REGIONS (HCC): Primary | ICD-10-CM

## 2022-02-03 ENCOUNTER — OFFICE VISIT (OUTPATIENT)
Dept: HEMATOLOGY/ONCOLOGY | Age: 22
End: 2022-02-03
Attending: CLINICAL NURSE SPECIALIST
Payer: COMMERCIAL

## 2022-02-03 VITALS
BODY MASS INDEX: 29.63 KG/M2 | HEIGHT: 71.18 IN | DIASTOLIC BLOOD PRESSURE: 78 MMHG | HEART RATE: 84 BPM | SYSTOLIC BLOOD PRESSURE: 121 MMHG | OXYGEN SATURATION: 96 % | RESPIRATION RATE: 16 BRPM | TEMPERATURE: 97 F | WEIGHT: 214 LBS

## 2022-02-03 DIAGNOSIS — C81.18 NODULAR SCLEROSIS HODGKIN LYMPHOMA OF LYMPH NODES OF MULTIPLE REGIONS (HCC): Primary | ICD-10-CM

## 2022-02-03 DIAGNOSIS — R41.840 IMPAIRED ATTENTION: ICD-10-CM

## 2022-02-03 DIAGNOSIS — Z51.11 CHEMOTHERAPY MANAGEMENT, ENCOUNTER FOR: ICD-10-CM

## 2022-02-03 DIAGNOSIS — T45.1X5A CHEMOTHERAPY INDUCED NEUTROPENIA (HCC): ICD-10-CM

## 2022-02-03 DIAGNOSIS — D70.1 CHEMOTHERAPY INDUCED NEUTROPENIA (HCC): ICD-10-CM

## 2022-02-03 LAB
BASOPHILS # BLD AUTO: 0.06 X10(3) UL (ref 0–0.2)
BASOPHILS NFR BLD AUTO: 2.1 %
EOSINOPHIL # BLD AUTO: 0.25 X10(3) UL (ref 0–0.7)
EOSINOPHIL NFR BLD AUTO: 8.8 %
ERYTHROCYTE [DISTWIDTH] IN BLOOD BY AUTOMATED COUNT: 15.6 %
HCT VFR BLD AUTO: 40.1 %
HGB BLD-MCNC: 13.7 G/DL
IMM GRANULOCYTES # BLD AUTO: 0.08 X10(3) UL (ref 0–1)
IMM GRANULOCYTES NFR BLD: 2.8 %
LYMPHOCYTES # BLD AUTO: 1.34 X10(3) UL (ref 1–4)
LYMPHOCYTES NFR BLD AUTO: 47.3 %
MCH RBC QN AUTO: 28.7 PG (ref 26–34)
MCHC RBC AUTO-ENTMCNC: 34.2 G/DL (ref 31–37)
MCV RBC AUTO: 83.9 FL
MONOCYTES # BLD AUTO: 0.41 X10(3) UL (ref 0.1–1)
MONOCYTES NFR BLD AUTO: 14.5 %
NEUTROPHILS # BLD AUTO: 0.69 X10 (3) UL (ref 1.5–7.7)
NEUTROPHILS # BLD AUTO: 0.69 X10(3) UL (ref 1.5–7.7)
NEUTROPHILS NFR BLD AUTO: 24.5 %
PLATELET # BLD AUTO: 254 10(3)UL (ref 150–450)
RBC # BLD AUTO: 4.78 X10(6)UL
WBC # BLD AUTO: 2.8 X10(3) UL (ref 4–11)

## 2022-02-03 PROCEDURE — 99215 OFFICE O/P EST HI 40 MIN: CPT | Performed by: NURSE PRACTITIONER

## 2022-02-03 PROCEDURE — 96375 TX/PRO/DX INJ NEW DRUG ADDON: CPT

## 2022-02-03 PROCEDURE — 85025 COMPLETE CBC W/AUTO DIFF WBC: CPT

## 2022-02-03 PROCEDURE — 96413 CHEMO IV INFUSION 1 HR: CPT

## 2022-02-03 PROCEDURE — 96411 CHEMO IV PUSH ADDL DRUG: CPT

## 2022-02-03 PROCEDURE — 96367 TX/PROPH/DG ADDL SEQ IV INF: CPT

## 2022-02-03 RX ORDER — DOXORUBICIN HYDROCHLORIDE 2 MG/ML
25 INJECTION, SOLUTION INTRAVENOUS ONCE
Status: COMPLETED | OUTPATIENT
Start: 2022-02-03 | End: 2022-02-03

## 2022-02-03 RX ADMIN — DOXORUBICIN HYDROCHLORIDE 52 MG: 2 INJECTION, SOLUTION INTRAVENOUS at 12:15:00

## 2022-02-03 NOTE — PROGRESS NOTES
Pt here for C3D15.   Arrives Ambulating independently, accompanied by Self          Modifications in dose or schedule: No    Pt denies current pregnancy     Frequency of blood return and site check throughout administration: Prior to administration   Discharged to Home, Ambulating independently, accompanied by:Self    Outpatient Oncology Care Plan  Problem list:  knowledge deficit  Problems related to:    chemotherapy  Interventions:  provided general teaching  Expected outcomes:  understands plan of care  Progress towards outcome:  making progress    Education Record    Learner:  Patient  Barriers / Limitations:  None  Method:  Brief focused  Outcome:  Shows understanding  Comments: Pt tolerated treatment

## 2022-02-03 NOTE — PROGRESS NOTES
Pt here for follow up and treatment. He states the chemo side effects are lasting a week. He states he has mental fog and altered taste for about a week. He states it makes it difficult for him to go to school.

## 2022-02-10 LAB — HCV AB SERPL QL IA: NONREACTIVE

## 2022-02-15 RX ORDER — DOXORUBICIN HYDROCHLORIDE 2 MG/ML
25 INJECTION, SOLUTION INTRAVENOUS ONCE
Status: CANCELLED | OUTPATIENT
Start: 2022-02-16

## 2022-02-15 RX ORDER — DOXORUBICIN HYDROCHLORIDE 2 MG/ML
25 INJECTION, SOLUTION INTRAVENOUS ONCE
Status: CANCELLED | OUTPATIENT
Start: 2022-03-02

## 2022-02-16 ENCOUNTER — OFFICE VISIT (OUTPATIENT)
Dept: HEMATOLOGY/ONCOLOGY | Age: 22
End: 2022-02-16
Attending: CLINICAL NURSE SPECIALIST
Payer: COMMERCIAL

## 2022-02-16 VITALS
RESPIRATION RATE: 18 BRPM | TEMPERATURE: 98 F | HEART RATE: 77 BPM | WEIGHT: 220 LBS | HEIGHT: 71.18 IN | SYSTOLIC BLOOD PRESSURE: 122 MMHG | BODY MASS INDEX: 30.46 KG/M2 | OXYGEN SATURATION: 97 % | DIASTOLIC BLOOD PRESSURE: 71 MMHG

## 2022-02-16 DIAGNOSIS — C81.18 NODULAR SCLEROSIS HODGKIN LYMPHOMA OF LYMPH NODES OF MULTIPLE REGIONS (HCC): Primary | ICD-10-CM

## 2022-02-16 DIAGNOSIS — Z51.11 CHEMOTHERAPY MANAGEMENT, ENCOUNTER FOR: ICD-10-CM

## 2022-02-16 LAB
ALBUMIN SERPL-MCNC: 3.8 G/DL (ref 3.4–5)
ALBUMIN/GLOB SERPL: 1.1 {RATIO} (ref 1–2)
ALP LIVER SERPL-CCNC: 47 U/L
ALT SERPL-CCNC: 44 U/L
ANION GAP SERPL CALC-SCNC: 6 MMOL/L (ref 0–18)
AST SERPL-CCNC: 15 U/L (ref 15–37)
BASOPHILS # BLD AUTO: 0.04 X10(3) UL (ref 0–0.2)
BASOPHILS NFR BLD AUTO: 1.4 %
BILIRUB SERPL-MCNC: 0.5 MG/DL (ref 0.1–2)
BUN BLD-MCNC: 18 MG/DL (ref 7–18)
CALCIUM BLD-MCNC: 9.4 MG/DL (ref 8.5–10.1)
CHLORIDE SERPL-SCNC: 108 MMOL/L (ref 98–112)
CO2 SERPL-SCNC: 27 MMOL/L (ref 21–32)
CREAT BLD-MCNC: 0.84 MG/DL
EOSINOPHIL # BLD AUTO: 0.17 X10(3) UL (ref 0–0.7)
EOSINOPHIL NFR BLD AUTO: 6 %
ERYTHROCYTE [DISTWIDTH] IN BLOOD BY AUTOMATED COUNT: 15.8 %
GLOBULIN PLAS-MCNC: 3.5 G/DL (ref 2.8–4.4)
GLUCOSE BLD-MCNC: 110 MG/DL (ref 70–99)
HCT VFR BLD AUTO: 40.6 %
HGB BLD-MCNC: 13.9 G/DL
IMM GRANULOCYTES # BLD AUTO: 0.03 X10(3) UL (ref 0–1)
IMM GRANULOCYTES NFR BLD: 1.1 %
LYMPHOCYTES # BLD AUTO: 1.23 X10(3) UL (ref 1–4)
LYMPHOCYTES NFR BLD AUTO: 43.8 %
MCH RBC QN AUTO: 28.8 PG (ref 26–34)
MCHC RBC AUTO-ENTMCNC: 34.2 G/DL (ref 31–37)
MCV RBC AUTO: 84.1 FL
MONOCYTES # BLD AUTO: 0.27 X10(3) UL (ref 0.1–1)
MONOCYTES NFR BLD AUTO: 9.6 %
NEUTROPHILS # BLD AUTO: 1.07 X10 (3) UL (ref 1.5–7.7)
NEUTROPHILS # BLD AUTO: 1.07 X10(3) UL (ref 1.5–7.7)
NEUTROPHILS NFR BLD AUTO: 38.1 %
OSMOLALITY SERPL CALC.SUM OF ELEC: 295 MOSM/KG (ref 275–295)
PLATELET # BLD AUTO: 249 10(3)UL (ref 150–450)
POTASSIUM SERPL-SCNC: 3.7 MMOL/L (ref 3.5–5.1)
PROT SERPL-MCNC: 7.3 G/DL (ref 6.4–8.2)
RBC # BLD AUTO: 4.83 X10(6)UL
SODIUM SERPL-SCNC: 141 MMOL/L (ref 136–145)
WBC # BLD AUTO: 2.8 X10(3) UL (ref 4–11)

## 2022-02-16 PROCEDURE — 96411 CHEMO IV PUSH ADDL DRUG: CPT

## 2022-02-16 PROCEDURE — 85025 COMPLETE CBC W/AUTO DIFF WBC: CPT

## 2022-02-16 PROCEDURE — 96413 CHEMO IV INFUSION 1 HR: CPT

## 2022-02-16 PROCEDURE — 80053 COMPREHEN METABOLIC PANEL: CPT

## 2022-02-16 PROCEDURE — 96375 TX/PRO/DX INJ NEW DRUG ADDON: CPT

## 2022-02-16 PROCEDURE — 99215 OFFICE O/P EST HI 40 MIN: CPT | Performed by: SPECIALIST

## 2022-02-16 PROCEDURE — 96367 TX/PROPH/DG ADDL SEQ IV INF: CPT

## 2022-02-16 RX ORDER — DOXORUBICIN HYDROCHLORIDE 2 MG/ML
25 INJECTION, SOLUTION INTRAVENOUS ONCE
Status: COMPLETED | OUTPATIENT
Start: 2022-02-16 | End: 2022-02-16

## 2022-02-16 RX ADMIN — DOXORUBICIN HYDROCHLORIDE 52 MG: 2 INJECTION, SOLUTION INTRAVENOUS at 12:32:00

## 2022-02-16 NOTE — PROGRESS NOTES
Pt here for C4D1. Arrives Ambulating independently, accompanied by Self          Modifications in dose or schedule: No       Frequency of blood return and site check throughout administration: Prior to administration and every 2-3ml for vesicants  Discharged to Home, Ambulating independently, accompanied by:Self    Outpatient Oncology Care Plan  Problem list:  knowledge deficit  Problems related to:    chemotherapy  Interventions:  provided general teaching  Expected outcomes:  understands plan of care  Progress towards outcome:  making progress    Education Record    Learner:  Patient  Barriers / Limitations:  None  Method:  Brief focused  Outcome:  Shows understanding  Comments:     Tolerated treatment

## 2022-03-02 ENCOUNTER — OFFICE VISIT (OUTPATIENT)
Dept: HEMATOLOGY/ONCOLOGY | Age: 22
End: 2022-03-02
Attending: CLINICAL NURSE SPECIALIST
Payer: COMMERCIAL

## 2022-03-02 VITALS
DIASTOLIC BLOOD PRESSURE: 77 MMHG | SYSTOLIC BLOOD PRESSURE: 139 MMHG | OXYGEN SATURATION: 100 % | HEART RATE: 80 BPM | RESPIRATION RATE: 18 BRPM

## 2022-03-02 VITALS
RESPIRATION RATE: 18 BRPM | TEMPERATURE: 97 F | WEIGHT: 226.5 LBS | SYSTOLIC BLOOD PRESSURE: 131 MMHG | BODY MASS INDEX: 31.36 KG/M2 | HEIGHT: 71.18 IN | HEART RATE: 84 BPM | OXYGEN SATURATION: 95 % | DIASTOLIC BLOOD PRESSURE: 78 MMHG

## 2022-03-02 DIAGNOSIS — Z51.11 CHEMOTHERAPY MANAGEMENT, ENCOUNTER FOR: ICD-10-CM

## 2022-03-02 DIAGNOSIS — C81.18 NODULAR SCLEROSIS HODGKIN LYMPHOMA OF LYMPH NODES OF MULTIPLE REGIONS (HCC): Primary | ICD-10-CM

## 2022-03-02 DIAGNOSIS — Z51.11 ENCOUNTER FOR CHEMOTHERAPY MANAGEMENT: ICD-10-CM

## 2022-03-02 LAB
BASOPHILS # BLD AUTO: 0.06 X10(3) UL (ref 0–0.2)
BASOPHILS NFR BLD AUTO: 2.2 %
EOSINOPHIL # BLD AUTO: 0.17 X10(3) UL (ref 0–0.7)
EOSINOPHIL NFR BLD AUTO: 6.1 %
ERYTHROCYTE [DISTWIDTH] IN BLOOD BY AUTOMATED COUNT: 15.8 %
HCT VFR BLD AUTO: 40.8 %
HGB BLD-MCNC: 13.9 G/DL
IMM GRANULOCYTES # BLD AUTO: 0.08 X10(3) UL (ref 0–1)
IMM GRANULOCYTES NFR BLD: 2.9 %
LYMPHOCYTES # BLD AUTO: 1.26 X10(3) UL (ref 1–4)
LYMPHOCYTES NFR BLD AUTO: 45.2 %
MCHC RBC AUTO-ENTMCNC: 34.1 G/DL (ref 31–37)
MCV RBC AUTO: 84.3 FL
MONOCYTES # BLD AUTO: 0.37 X10(3) UL (ref 0.1–1)
MONOCYTES NFR BLD AUTO: 13.3 %
NEUTROPHILS # BLD AUTO: 0.85 X10 (3) UL (ref 1.5–7.7)
NEUTROPHILS # BLD AUTO: 0.85 X10(3) UL (ref 1.5–7.7)
NEUTROPHILS NFR BLD AUTO: 30.3 %
PLATELET # BLD AUTO: 289 10(3)UL (ref 150–450)
RBC # BLD AUTO: 4.84 X10(6)UL
WBC # BLD AUTO: 2.8 X10(3) UL (ref 4–11)

## 2022-03-02 PROCEDURE — 96413 CHEMO IV INFUSION 1 HR: CPT

## 2022-03-02 PROCEDURE — 96375 TX/PRO/DX INJ NEW DRUG ADDON: CPT

## 2022-03-02 PROCEDURE — 85025 COMPLETE CBC W/AUTO DIFF WBC: CPT

## 2022-03-02 PROCEDURE — 99215 OFFICE O/P EST HI 40 MIN: CPT | Performed by: SPECIALIST

## 2022-03-02 PROCEDURE — 96411 CHEMO IV PUSH ADDL DRUG: CPT

## 2022-03-02 PROCEDURE — 96367 TX/PROPH/DG ADDL SEQ IV INF: CPT

## 2022-03-02 RX ORDER — DOXORUBICIN HYDROCHLORIDE 2 MG/ML
25 INJECTION, SOLUTION INTRAVENOUS ONCE
Status: COMPLETED | OUTPATIENT
Start: 2022-03-02 | End: 2022-03-02

## 2022-03-02 RX ADMIN — DOXORUBICIN HYDROCHLORIDE 52 MG: 2 INJECTION, SOLUTION INTRAVENOUS at 12:48:00

## 2022-03-02 NOTE — PROGRESS NOTES
Patient is here today for follow up with [facility] for Hodgkins Lymphoma C4D15 Reyes, Vinblastine and Dacarbazine. Patient denies pain. C/O fatigue one week post treatment. Nausea post treatment controlled with antiemetics. Medication list,  medical history and toxicities  were reviewed and updated. Education Record    Learner:  Patient    Disease / Diagnosis: Hodgkins Lymphoma    Barriers / Limitations:  None   Comments:    Method:  Brief focused, Discussion, Printed material and Reinforcement   Comments:    General Topics:  Medication, Pain, Procedure and Plan of care reviewed   Comments:    Outcome:  Shows understanding   Comments:  Post Md visit patient ok for treatment - Treating RN notified. AVS provided and follow up reviewed. Patient instructed to call as needed.

## 2022-03-02 NOTE — PROGRESS NOTES
Pt here for C4D15. Arrives Ambulating independently, accompanied by Self           Pregnancy screening: Not applicable    Modifications in dose or schedule: No     Frequency of blood return and site check throughout administration: Prior to administration, Every 2-3 ml IVP and At completion of therapy   Discharged to Home, Ambulating independently, accompanied by:Self    Outpatient Oncology Care Plan  Problem list:  knowledge deficit  Problems related to:    chemotherapy  Interventions:  provided general teaching  Expected outcomes:  understands plan of care  Progress towards outcome:  making progress    Education Record    Learner:  Patient  Barriers / Limitations:  None  Method:  Discussion and Reinforcement  Outcome:  Shows understanding  Comments: At completion of Emend, pt complained of pain in throat when taking deep breathe. No other complaints reported. Saline bolus infused. GIFTY Cortez notified and at bedside. No other orders carried out at this time. VS taken. See flowsheet.

## 2022-03-13 RX ORDER — DOXORUBICIN HYDROCHLORIDE 2 MG/ML
25 INJECTION, SOLUTION INTRAVENOUS ONCE
Status: CANCELLED | OUTPATIENT
Start: 2022-03-16

## 2022-03-13 RX ORDER — DOXORUBICIN HYDROCHLORIDE 2 MG/ML
25 INJECTION, SOLUTION INTRAVENOUS ONCE
Status: CANCELLED | OUTPATIENT
Start: 2022-03-30

## 2022-03-16 ENCOUNTER — OFFICE VISIT (OUTPATIENT)
Dept: HEMATOLOGY/ONCOLOGY | Age: 22
End: 2022-03-16
Attending: CLINICAL NURSE SPECIALIST
Payer: COMMERCIAL

## 2022-03-16 VITALS
HEART RATE: 88 BPM | HEIGHT: 71.18 IN | DIASTOLIC BLOOD PRESSURE: 86 MMHG | RESPIRATION RATE: 16 BRPM | OXYGEN SATURATION: 96 % | TEMPERATURE: 97 F | BODY MASS INDEX: 31.57 KG/M2 | WEIGHT: 228 LBS | SYSTOLIC BLOOD PRESSURE: 156 MMHG

## 2022-03-16 DIAGNOSIS — C81.18 NODULAR SCLEROSIS HODGKIN LYMPHOMA OF LYMPH NODES OF MULTIPLE REGIONS (HCC): Primary | ICD-10-CM

## 2022-03-16 DIAGNOSIS — Z51.11 ENCOUNTER FOR CHEMOTHERAPY MANAGEMENT: ICD-10-CM

## 2022-03-16 DIAGNOSIS — Z51.11 CHEMOTHERAPY MANAGEMENT, ENCOUNTER FOR: ICD-10-CM

## 2022-03-16 LAB
ALBUMIN SERPL-MCNC: 4 G/DL (ref 3.4–5)
ALBUMIN/GLOB SERPL: 1.2 {RATIO} (ref 1–2)
ALP LIVER SERPL-CCNC: 49 U/L
ALT SERPL-CCNC: 62 U/L
ANION GAP SERPL CALC-SCNC: 4 MMOL/L (ref 0–18)
AST SERPL-CCNC: 23 U/L (ref 15–37)
BASOPHILS # BLD AUTO: 0.05 X10(3) UL (ref 0–0.2)
BASOPHILS NFR BLD AUTO: 1.6 %
BILIRUB SERPL-MCNC: 0.7 MG/DL (ref 0.1–2)
BUN BLD-MCNC: 16 MG/DL (ref 7–18)
CALCIUM BLD-MCNC: 9.7 MG/DL (ref 8.5–10.1)
CHLORIDE SERPL-SCNC: 107 MMOL/L (ref 98–112)
CO2 SERPL-SCNC: 29 MMOL/L (ref 21–32)
CREAT BLD-MCNC: 1.04 MG/DL
EOSINOPHIL # BLD AUTO: 0.18 X10(3) UL (ref 0–0.7)
EOSINOPHIL NFR BLD AUTO: 5.6 %
ERYTHROCYTE [DISTWIDTH] IN BLOOD BY AUTOMATED COUNT: 15.8 %
GLOBULIN PLAS-MCNC: 3.3 G/DL (ref 2.8–4.4)
GLUCOSE BLD-MCNC: 104 MG/DL (ref 70–99)
HCT VFR BLD AUTO: 41.1 %
HGB BLD-MCNC: 14.2 G/DL
IMM GRANULOCYTES # BLD AUTO: 0.04 X10(3) UL (ref 0–1)
IMM GRANULOCYTES NFR BLD: 1.3 %
LYMPHOCYTES # BLD AUTO: 1.39 X10(3) UL (ref 1–4)
LYMPHOCYTES NFR BLD AUTO: 43.6 %
MCH RBC QN AUTO: 28.8 PG (ref 26–34)
MCHC RBC AUTO-ENTMCNC: 34.5 G/DL (ref 31–37)
MCV RBC AUTO: 83.4 FL
MONOCYTES # BLD AUTO: 0.46 X10(3) UL (ref 0.1–1)
MONOCYTES NFR BLD AUTO: 14.4 %
NEUTROPHILS # BLD AUTO: 1.07 X10 (3) UL (ref 1.5–7.7)
NEUTROPHILS # BLD AUTO: 1.07 X10(3) UL (ref 1.5–7.7)
NEUTROPHILS NFR BLD AUTO: 33.5 %
OSMOLALITY SERPL CALC.SUM OF ELEC: 291 MOSM/KG (ref 275–295)
PLATELET # BLD AUTO: 306 10(3)UL (ref 150–450)
POTASSIUM SERPL-SCNC: 3.9 MMOL/L (ref 3.5–5.1)
PROT SERPL-MCNC: 7.3 G/DL (ref 6.4–8.2)
RBC # BLD AUTO: 4.93 X10(6)UL
SODIUM SERPL-SCNC: 140 MMOL/L (ref 136–145)
WBC # BLD AUTO: 3.2 X10(3) UL (ref 4–11)

## 2022-03-16 PROCEDURE — 80053 COMPREHEN METABOLIC PANEL: CPT

## 2022-03-16 PROCEDURE — 96367 TX/PROPH/DG ADDL SEQ IV INF: CPT

## 2022-03-16 PROCEDURE — 85025 COMPLETE CBC W/AUTO DIFF WBC: CPT

## 2022-03-16 PROCEDURE — 96375 TX/PRO/DX INJ NEW DRUG ADDON: CPT

## 2022-03-16 PROCEDURE — 96411 CHEMO IV PUSH ADDL DRUG: CPT

## 2022-03-16 PROCEDURE — 96413 CHEMO IV INFUSION 1 HR: CPT

## 2022-03-16 PROCEDURE — 99215 OFFICE O/P EST HI 40 MIN: CPT | Performed by: SPECIALIST

## 2022-03-16 RX ORDER — DOXORUBICIN HYDROCHLORIDE 2 MG/ML
25 INJECTION, SOLUTION INTRAVENOUS ONCE
Status: COMPLETED | OUTPATIENT
Start: 2022-03-16 | End: 2022-03-16

## 2022-03-16 RX ADMIN — DOXORUBICIN HYDROCHLORIDE 52 MG: 2 INJECTION, SOLUTION INTRAVENOUS at 11:41:00

## 2022-03-16 NOTE — PROGRESS NOTES
Patient is here today for follow up  with [facility] for Hodgkin's Lymphoma. C5D1 Reyes Vinblastine Dacarbazine. Patient denies pain. Fatigue post treatment. Yo LE leg cramping post treatment. Medication list,  medical history and toxicities - see toxicities. were reviewed and updated. Education Record    Learner:  Patient    Disease / Diagnosis: Hodgkin's Lymphoma    Barriers / Limitations:  None   Comments:    Method:  Brief focused, Discussion, Printed material and Reinforcement   Comments:    General Topics:  Medication, Pain, Procedure and Plan of care reviewed   Comments:    Outcome:  Shows understanding   Comments:  Corrin Curling MD visit. Patient ok for treatment. Treating RN notified. AVS provided and follow up reviewed. Patient instructed to call as needed.

## 2022-03-16 NOTE — PROGRESS NOTES
Pt here for C5D1. Arrives Ambulating independently, accompanied by Self           Pregnancy screening: Not applicable    Modifications in dose or schedule: No     Frequency of blood return and site check throughout administration: Prior to administration, Every 2-3 ml IVP and At completion of therapy   Discharged to Home, Ambulating independently, accompanied by:Self    Outpatient Oncology Care Plan  Problem list:  knowledge deficit  Problems related to:    chemotherapy  Interventions:  emotional support given  maintain safe environment  patient/family supported  chemotherapy teaching  monitor effect of therapy  monitor lab values  promoted rest  provided general teaching  Expected outcomes:  understands plan of care  Progress towards outcome:  making progress    Education Record    Learner:  Patient  Barriers / Limitations:  None  Method:  Discussion  Outcome:  Shows understanding  Comments: patient ambulatory with no complaints. Tolerated treatment today. Port with great blood return. States he will get his next appts off of mychart.

## 2022-03-30 ENCOUNTER — OFFICE VISIT (OUTPATIENT)
Dept: HEMATOLOGY/ONCOLOGY | Age: 22
End: 2022-03-30
Attending: CLINICAL NURSE SPECIALIST
Payer: COMMERCIAL

## 2022-03-30 ENCOUNTER — SOCIAL WORK SERVICES (OUTPATIENT)
Dept: HEMATOLOGY/ONCOLOGY | Facility: HOSPITAL | Age: 22
End: 2022-03-30

## 2022-03-30 VITALS
OXYGEN SATURATION: 97 % | HEIGHT: 71.18 IN | RESPIRATION RATE: 18 BRPM | SYSTOLIC BLOOD PRESSURE: 144 MMHG | TEMPERATURE: 96 F | HEART RATE: 108 BPM | BODY MASS INDEX: 32.33 KG/M2 | WEIGHT: 233.5 LBS | DIASTOLIC BLOOD PRESSURE: 79 MMHG

## 2022-03-30 DIAGNOSIS — C81.18 NODULAR SCLEROSIS HODGKIN LYMPHOMA OF LYMPH NODES OF MULTIPLE REGIONS (HCC): Primary | ICD-10-CM

## 2022-03-30 DIAGNOSIS — R20.2 NUMBNESS AND TINGLING IN LEFT HAND: ICD-10-CM

## 2022-03-30 DIAGNOSIS — Z51.11 CHEMOTHERAPY MANAGEMENT, ENCOUNTER FOR: ICD-10-CM

## 2022-03-30 DIAGNOSIS — R20.0 NUMBNESS AND TINGLING IN LEFT HAND: ICD-10-CM

## 2022-03-30 LAB
BASOPHILS # BLD AUTO: 0.03 X10(3) UL (ref 0–0.2)
BASOPHILS NFR BLD AUTO: 1.1 %
EOSINOPHIL # BLD AUTO: 0.12 X10(3) UL (ref 0–0.7)
EOSINOPHIL NFR BLD AUTO: 4.5 %
ERYTHROCYTE [DISTWIDTH] IN BLOOD BY AUTOMATED COUNT: 15.8 %
HCT VFR BLD AUTO: 40.3 %
HGB BLD-MCNC: 13.6 G/DL
IMM GRANULOCYTES # BLD AUTO: 0.02 X10(3) UL (ref 0–1)
IMM GRANULOCYTES NFR BLD: 0.8 %
LYMPHOCYTES # BLD AUTO: 1.35 X10(3) UL (ref 1–4)
LYMPHOCYTES NFR BLD AUTO: 50.9 %
MCH RBC QN AUTO: 28.3 PG (ref 26–34)
MCHC RBC AUTO-ENTMCNC: 33.7 G/DL (ref 31–37)
MCV RBC AUTO: 84 FL
MONOCYTES # BLD AUTO: 0.38 X10(3) UL (ref 0.1–1)
MONOCYTES NFR BLD AUTO: 14.3 %
NEUTROPHILS # BLD AUTO: 0.75 X10 (3) UL (ref 1.5–7.7)
NEUTROPHILS # BLD AUTO: 0.75 X10(3) UL (ref 1.5–7.7)
NEUTROPHILS NFR BLD AUTO: 28.4 %
PLATELET # BLD AUTO: 299 10(3)UL (ref 150–450)
RBC # BLD AUTO: 4.8 X10(6)UL
WBC # BLD AUTO: 2.7 X10(3) UL (ref 4–11)

## 2022-03-30 PROCEDURE — 96411 CHEMO IV PUSH ADDL DRUG: CPT

## 2022-03-30 PROCEDURE — 85025 COMPLETE CBC W/AUTO DIFF WBC: CPT

## 2022-03-30 PROCEDURE — 99215 OFFICE O/P EST HI 40 MIN: CPT | Performed by: NURSE PRACTITIONER

## 2022-03-30 PROCEDURE — 96413 CHEMO IV INFUSION 1 HR: CPT

## 2022-03-30 PROCEDURE — 96367 TX/PROPH/DG ADDL SEQ IV INF: CPT

## 2022-03-30 PROCEDURE — 96375 TX/PRO/DX INJ NEW DRUG ADDON: CPT

## 2022-03-30 RX ORDER — DOXORUBICIN HYDROCHLORIDE 2 MG/ML
25 INJECTION, SOLUTION INTRAVENOUS ONCE
Status: COMPLETED | OUTPATIENT
Start: 2022-03-30 | End: 2022-03-30

## 2022-03-30 RX ADMIN — DOXORUBICIN HYDROCHLORIDE 52 MG: 2 INJECTION, SOLUTION INTRAVENOUS at 11:06:00

## 2022-03-30 NOTE — PROGRESS NOTES
SW met with pt to provide support and encouragement. Pt identified living at home with his parents and sister, age 24. Pt states that his parents are supportive but mostly his mother understands. Pt comes alone to treatment. Support given. Pt explained that he is currently on a break from [facility] but plans to transfer to [employer] for  studies. He explained feelings and thoughts after diagnosis and how he manages. He states that the taste in his mouth is the hardest part including the fatigue. SW provided lemonheads and encouraged pt to rest.  Pt states that he often finds that lifting weights helps him to feel good. Coping skills reviewed and support services encouraged if pt is experiencing depression or anxiety. SW provided emotional support. Spencer Jacobo LCSW   at [facility], [address], [address] [place] [address]  Wilder Hernandez [address] [place] [address] Walt Brock  Ph: [phone]. [email or web address]  Fax: [phone]

## 2022-03-30 NOTE — PROGRESS NOTES
Pt here for C5D15. Arrives Ambulating independently, accompanied by Self           Pregnancy screening: Not applicable    Modifications in dose or schedule: No     Frequency of blood return and site check throughout administration: Prior to administration, Prior to each drug, Every 2-3 ml IVP and At completion of therapy   Discharged to Home, Ambulating independently, accompanied by:Self    Outpatient Oncology Care Plan  Problem list:  knowledge deficit  Problems related to:    chemotherapy  Interventions:  emotional support given  maintain safe environment  patient/family supported  chemotherapy teaching  monitor effect of therapy  monitor lab values  promoted rest  provided general teaching  Expected outcomes:  understands plan of care  Progress towards outcome:  making progress    Education Record    Learner:  Patient  Barriers / Limitations:  None  Method:  Discussion  Outcome:  Shows understanding  Comments: patient ambulatory. States he is having some numbness and tingling in his finger tips that started Monday. Shirley DURBIN made aware. Tolerated treatment today. Discharged home in stable condition.

## 2022-03-30 NOTE — PROGRESS NOTES
Outpatient Oncology Care Plan  Problem list:  diarrhea  fatigue  knowledge deficit    Problems related to:    disease/disease progression  side effect of treatment    Interventions:  provided general teaching    Expected outcomes:  understands plan of care    Progress towards outcome:  making progress    Education Record    Learner:  Patient  Barriers / Limitations:  None  Method:  Brief focused  Outcome:  Shows understanding  Comments:OTV D15C5 AVD expected. Pt states his energy will be low after treatment but then picks up after a few days. Reports diarrhea for a few days following treatment. States on Monday he started to have mild tingling in his left ring/pinky finger.

## 2022-04-06 RX ORDER — DOXORUBICIN HYDROCHLORIDE 2 MG/ML
25 INJECTION, SOLUTION INTRAVENOUS ONCE
OUTPATIENT
Start: 2022-04-27

## 2022-04-06 RX ORDER — DOXORUBICIN HYDROCHLORIDE 2 MG/ML
25 INJECTION, SOLUTION INTRAVENOUS ONCE
Status: CANCELLED | OUTPATIENT
Start: 2022-04-13

## 2022-04-06 NOTE — PROGRESS NOTES
[facility] Progress Note      Patient Name: Elbert Preciado   YOB: 2000  Medical Record Number: [id number]  Attending Physician: Nayely Franco M.D. Date of Visit: 4/13/2022      Chief Complaint  Hodgkin lymphoma, nodular sclerosis - follow up and treatment. Oncologic History  Angely Rodgers is a 24year old male who presented to ED on 11/06/2021 after CT chest, abdomen, pelvis on 11/05/2021 showed extensive adenopathy throughout the mediastinum measuring greater than 10 cm, enlarged right cardiophrenic angle lymph node, pericardial effusion, right pleural effusion, and splenomegaly. CT imaging was performed due to complaints of 60 lb weight loss over 6 months, generalized weakness, poor appetite, night sweats, and shortness of breath. Admission laboratory studies were significant for elevated ESR 99 mm/hr and elevated direct bilirubin and alkaline phosphatase. Over the course of the hospitalization, without any specific intervention except hydration, the liver function tests improved. MRI/MRCP was negative for biliary obstruction. On 11/08/2021 he underwent VATS with lymph node biopsy. Pathology showed EBV positive, nodular sclerosis Hodgkin lymphoma. PET/CT Scan on 11/12/2021 showed uptake in a right retroclavicular lymph node and extensive adenopathy in the mediastinum. There was no abnormal uptake below the diaphragm. Patient started therapy with ABVD on 11/26/2021. PET/CT scan after two cycles showed a complete response with no areas of abnormal uptake. Beginning on 01/19/2021, he continued cycle 3 with AVD chemotherapy as per the [facility] trial.    History of Present Illness  Patient returns for scheduled follow up and treatment. He denies any shortness of breath or cough. He reports transient fatigue and nausea after chemotherapy. He denies peripheral neuropathy.      Past Medical History (historical data, reviewed by physician)  Hodgkin lymphoma (as above). Past Surgical History (historical data, reviewed by physician)  None. Family History (historical data, reviewed by physician)  No known family history of malignancy. Social History (historical data, reviewed by physician)  Denies tobacco use. Current Medications  No outpatient medications have been marked as taking for the 4/13/22 encounter (Office Visit) with Landen Landeros MD.    Allergies   Mr. Mitch Nugent has No Known Allergies. Vital Signs   Reviewed in electronic medical record. Physical Examination  Constitutional      Well developed, well nourished. Appears close to chronological age. No apparent distress. Head                   Normocephalic and atraumatic. Eyes                  Conjunctiva clear; sclera anicteric. ENMT                 External nose normal; external ears normal.  Neck                   Supple, without masses. Respiratory          Normal effort; no respiratory distress; lungs clear to auscultation bilaterally. Cardiovascular     Regular rate and rhythm. Extremities          No lower extremity edema. Neurologic           Motor and sensory grossly intact. Psychiatric          Mood and affect appropriate.     Laboratory   Recent Results (from the past 48 hour(s))   COMP METABOLIC PANEL (14)    Collection Time: 04/13/22 10:20 AM   Result Value Ref Range    Glucose 90 70 - 99 mg/dL    Sodium 141 136 - 145 mmol/L    Potassium 3.6 3.5 - 5.1 mmol/L    Chloride 108 98 - 112 mmol/L    CO2 28.0 21.0 - 32.0 mmol/L    Anion Gap 5 0 - 18 mmol/L    BUN 18 7 - 18 mg/dL    Creatinine 1.15 0.70 - 1.30 mg/dL    Calcium, Total 9.3 8.5 - 10.1 mg/dL    Calculated Osmolality 293 275 - 295 mOsm/kg    GFR, Non- 90 >=60    GFR, -American 104 >=60    AST 26 15 - 37 U/L    ALT 56 16 - 61 U/L    Alkaline Phosphatase 50 45 - 117 U/L    Bilirubin, Total 0.8 0.1 - 2.0 mg/dL    Total Protein 7.2 6.4 - 8.2 g/dL    Albumin 3.9 3.4 - 5.0 g/dL    Globulin  3.3 2.8 - 4.4 g/dL    A/G Ratio 1.2 1.0 - 2.0    Patient Fasting for CMP? No    CBC W/ DIFFERENTIAL    Collection Time: 04/13/22 10:20 AM   Result Value Ref Range    WBC 3.2 (L) 4.0 - 11.0 x10(3) uL    RBC 4.62 4.30 - 5.70 x10(6)uL    HGB 13.3 13.0 - 17.5 g/dL    HCT 39.7 39.0 - 53.0 %    .0 150.0 - 450.0 10(3)uL    MCV 85.9 80.0 - 100.0 fL    MCH 28.8 26.0 - 34.0 pg    MCHC 33.5 31.0 - 37.0 g/dL    RDW 15.9 %    Neutrophil Absolute Prelim 0.96 (L) 1.50 - 7.70 x10 (3) uL    Neutrophil Absolute 0.96 (L) 1.50 - 7.70 x10(3) uL    Lymphocyte Absolute 1.48 1.00 - 4.00 x10(3) uL    Monocyte Absolute 0.49 0.10 - 1.00 x10(3) uL    Eosinophil Absolute 0.16 0.00 - 0.70 x10(3) uL    Basophil Absolute 0.05 0.00 - 0.20 x10(3) uL    Immature Granulocyte Absolute 0.03 0.00 - 1.00 x10(3) uL    Neutrophil % 30.3 %    Lymphocyte % 46.7 %    Monocyte % 15.5 %    Eosinophil % 5.0 %    Basophil % 1.6 %    Immature Granulocyte % 0.9 %      Impression and Plan   1. Hodgkin lymphoma, nodular sclerosis type: Patient with classical Hodgkin's lymphoma. He is staged as bulky stage IIB. Continue with C6D1 of AVD chemotherapy. Planned Follow Up  Patient will return for follow up and treatment in 2 weeks. Electronically signed by:    Eric Lyles M.D.   Associate Medical Director of Oncology [facility], [place], [address] [facility]

## 2022-04-13 ENCOUNTER — OFFICE VISIT (OUTPATIENT)
Dept: HEMATOLOGY/ONCOLOGY | Age: 22
End: 2022-04-13
Attending: CLINICAL NURSE SPECIALIST
Payer: COMMERCIAL

## 2022-04-13 VITALS
TEMPERATURE: 98 F | BODY MASS INDEX: 32.81 KG/M2 | HEIGHT: 71.18 IN | DIASTOLIC BLOOD PRESSURE: 70 MMHG | OXYGEN SATURATION: 97 % | SYSTOLIC BLOOD PRESSURE: 116 MMHG | WEIGHT: 237 LBS | RESPIRATION RATE: 20 BRPM | HEART RATE: 81 BPM

## 2022-04-13 DIAGNOSIS — Z51.11 CHEMOTHERAPY MANAGEMENT, ENCOUNTER FOR: Primary | ICD-10-CM

## 2022-04-13 DIAGNOSIS — C81.18 NODULAR SCLEROSIS HODGKIN LYMPHOMA OF LYMPH NODES OF MULTIPLE REGIONS (HCC): ICD-10-CM

## 2022-04-13 DIAGNOSIS — C81.18 NODULAR SCLEROSIS HODGKIN LYMPHOMA OF LYMPH NODES OF MULTIPLE REGIONS (HCC): Primary | ICD-10-CM

## 2022-04-13 DIAGNOSIS — Z51.11 ENCOUNTER FOR CHEMOTHERAPY MANAGEMENT: ICD-10-CM

## 2022-04-13 LAB
ALBUMIN SERPL-MCNC: 3.9 G/DL (ref 3.4–5)
ALBUMIN/GLOB SERPL: 1.2 {RATIO} (ref 1–2)
ALP LIVER SERPL-CCNC: 50 U/L
ALT SERPL-CCNC: 56 U/L
ANION GAP SERPL CALC-SCNC: 5 MMOL/L (ref 0–18)
AST SERPL-CCNC: 26 U/L (ref 15–37)
BASOPHILS # BLD AUTO: 0.05 X10(3) UL (ref 0–0.2)
BASOPHILS NFR BLD AUTO: 1.6 %
BILIRUB SERPL-MCNC: 0.8 MG/DL (ref 0.1–2)
BUN BLD-MCNC: 18 MG/DL (ref 7–18)
CALCIUM BLD-MCNC: 9.3 MG/DL (ref 8.5–10.1)
CHLORIDE SERPL-SCNC: 108 MMOL/L (ref 98–112)
CO2 SERPL-SCNC: 28 MMOL/L (ref 21–32)
CREAT BLD-MCNC: 1.15 MG/DL
EOSINOPHIL # BLD AUTO: 0.16 X10(3) UL (ref 0–0.7)
EOSINOPHIL NFR BLD AUTO: 5 %
ERYTHROCYTE [DISTWIDTH] IN BLOOD BY AUTOMATED COUNT: 15.9 %
FASTING STATUS PATIENT QL REPORTED: NO
GLOBULIN PLAS-MCNC: 3.3 G/DL (ref 2.8–4.4)
GLUCOSE BLD-MCNC: 90 MG/DL (ref 70–99)
HCT VFR BLD AUTO: 39.7 %
HGB BLD-MCNC: 13.3 G/DL
IMM GRANULOCYTES # BLD AUTO: 0.03 X10(3) UL (ref 0–1)
IMM GRANULOCYTES NFR BLD: 0.9 %
LYMPHOCYTES # BLD AUTO: 1.48 X10(3) UL (ref 1–4)
LYMPHOCYTES NFR BLD AUTO: 46.7 %
MCH RBC QN AUTO: 28.8 PG (ref 26–34)
MCHC RBC AUTO-ENTMCNC: 33.5 G/DL (ref 31–37)
MCV RBC AUTO: 85.9 FL
MONOCYTES # BLD AUTO: 0.49 X10(3) UL (ref 0.1–1)
MONOCYTES NFR BLD AUTO: 15.5 %
NEUTROPHILS # BLD AUTO: 0.96 X10 (3) UL (ref 1.5–7.7)
NEUTROPHILS # BLD AUTO: 0.96 X10(3) UL (ref 1.5–7.7)
NEUTROPHILS NFR BLD AUTO: 30.3 %
OSMOLALITY SERPL CALC.SUM OF ELEC: 293 MOSM/KG (ref 275–295)
PLATELET # BLD AUTO: 308 10(3)UL (ref 150–450)
POTASSIUM SERPL-SCNC: 3.6 MMOL/L (ref 3.5–5.1)
PROT SERPL-MCNC: 7.2 G/DL (ref 6.4–8.2)
RBC # BLD AUTO: 4.62 X10(6)UL
SODIUM SERPL-SCNC: 141 MMOL/L (ref 136–145)
WBC # BLD AUTO: 3.2 X10(3) UL (ref 4–11)

## 2022-04-13 PROCEDURE — 96413 CHEMO IV INFUSION 1 HR: CPT

## 2022-04-13 PROCEDURE — 96367 TX/PROPH/DG ADDL SEQ IV INF: CPT

## 2022-04-13 PROCEDURE — 80053 COMPREHEN METABOLIC PANEL: CPT

## 2022-04-13 PROCEDURE — 85025 COMPLETE CBC W/AUTO DIFF WBC: CPT

## 2022-04-13 PROCEDURE — 99215 OFFICE O/P EST HI 40 MIN: CPT | Performed by: SPECIALIST

## 2022-04-13 PROCEDURE — 96411 CHEMO IV PUSH ADDL DRUG: CPT

## 2022-04-13 PROCEDURE — 96375 TX/PRO/DX INJ NEW DRUG ADDON: CPT

## 2022-04-13 RX ORDER — DOXORUBICIN HYDROCHLORIDE 2 MG/ML
25 INJECTION, SOLUTION INTRAVENOUS ONCE
Status: COMPLETED | OUTPATIENT
Start: 2022-04-13 | End: 2022-04-13

## 2022-04-13 RX ADMIN — DOXORUBICIN HYDROCHLORIDE 52 MG: 2 INJECTION, SOLUTION INTRAVENOUS at 12:26:00

## 2022-04-13 NOTE — PROGRESS NOTES
Pt here for C6D1.   Arrives Ambulating independently, accompanied by Self           Pregnancy screening: Not applicable    Modifications in dose or schedule: No     Frequency of blood return and site check throughout administration: Prior to administration and Every 2-3 ml IVP   Discharged to Home, Ambulating independently, accompanied by:Self    Outpatient Oncology Care Plan  Problem list:  knowledge deficit  Problems related to:    chemotherapy  Interventions:  monitor lab values  provided general teaching  Expected outcomes:  optimal lab values  understands plan of care  Progress towards outcome:  making progress    Education Record    Learner:  Patient  Barriers / Limitations:  None  Method:  Discussion  Outcome:  Shows understanding  Comments:

## 2022-04-13 NOTE — PROGRESS NOTES
Patient is here today for follow up with Grace Appiah for Hodgkin's Lymphoma. C61 Reyes, Vinblastine and Dacarbazine. Patient denies pain. Fatigue. Denies nausea - stated appetite is good. Medication list, medical history and toxicities were reviewed and updated. Education Record    Learner:  Patient     Disease / Diagnosis: Hodgkin's lymphoma     Barriers / Limitations:  None   Comments:    Method:  Brief focused, Discussion, Printed material and Reinforcement   Comments:    General Topics:  Medication, Pain, Procedure and Plan of care reviewed   Comments:    Outcome:  Shows understanding   Comments:  Leticia Dearth for treatment - per Grace Appiah  AVS provided and follow up reviewed. Patient instructed to call as needed.

## 2022-04-27 ENCOUNTER — OFFICE VISIT (OUTPATIENT)
Dept: HEMATOLOGY/ONCOLOGY | Age: 22
End: 2022-04-27
Attending: CLINICAL NURSE SPECIALIST
Payer: COMMERCIAL

## 2022-04-27 VITALS
DIASTOLIC BLOOD PRESSURE: 89 MMHG | RESPIRATION RATE: 20 BRPM | HEIGHT: 71.18 IN | HEART RATE: 81 BPM | OXYGEN SATURATION: 99 % | WEIGHT: 241 LBS | TEMPERATURE: 96 F | SYSTOLIC BLOOD PRESSURE: 139 MMHG | BODY MASS INDEX: 33.37 KG/M2

## 2022-04-27 DIAGNOSIS — C81.18 NODULAR SCLEROSIS HODGKIN LYMPHOMA OF LYMPH NODES OF MULTIPLE REGIONS (HCC): Primary | ICD-10-CM

## 2022-04-27 PROCEDURE — 99212 OFFICE O/P EST SF 10 MIN: CPT | Performed by: SPECIALIST

## 2022-04-27 NOTE — PROGRESS NOTES
Spoke with patient - stated he did not want labs drawn or treatment today. Would like to see Ned Kat. Post MD visit patient declined treatment. Per Ned Kat. Follow up in one month with  - PET scan few days prior to appointment - Verbal and written instruction given to patient.

## 2022-04-27 NOTE — PROGRESS NOTES
Pt arrived to cancer center with mother and father. Pt  refused labs to be drawn. Requested to speak to MD before proceeding with treatment. MD spoke with pt at length. Pt and MD agreed to not receive chemotherapy today. Pt instructed to schedule a follow up and lab appt in one month.

## 2022-04-28 ENCOUNTER — TELEPHONE (OUTPATIENT)
Dept: HEMATOLOGY/ONCOLOGY | Facility: HOSPITAL | Age: 22
End: 2022-04-28

## 2022-04-28 NOTE — TELEPHONE ENCOUNTER
Patient mother called insisting port be removed as soon as possible. He was scheduled for May 4th. Jose Blackwell is now aware patient was to keep port until after next appointment in one month. Wants to know if she should reschedule patient or leave appointment for May 4th.

## 2022-04-29 ENCOUNTER — TELEPHONE (OUTPATIENT)
Dept: FAMILY MEDICINE CLINIC | Facility: CLINIC | Age: 22
End: 2022-04-29

## 2022-04-29 NOTE — TELEPHONE ENCOUNTER
Pts mom requesting new oncologist and will need one soon due to he needs pet scan within the next 30 days. Pt is not happy with the last oncologist and does not want to see any other ones there.

## 2022-04-29 NOTE — TELEPHONE ENCOUNTER
Attempted to call the patient for more information. Phone continued to ring. Called the patient's mother. Spoke with the patient and patient's mother (phone was on speaker). Patient's mother states that her and the patient have not liked the care that the patient has been receiving at oncologist office. Does not want to see current oncologist or any oncologist at that office. Did not complete last chemo treatment (did not give reason why), wants port out now (does not want to wait until after PET scan), wants PET scan ordered by different provider and order to take out port. Notified the patient and patient's mother that it would be best to schedule an appointment with PCP to discuss treatment options. Both verbalized understanding and agreed.  Appointment made for Monday at 1240pm.

## 2022-05-02 ENCOUNTER — OFFICE VISIT (OUTPATIENT)
Dept: FAMILY MEDICINE CLINIC | Facility: CLINIC | Age: 22
End: 2022-05-02
Payer: COMMERCIAL

## 2022-05-02 VITALS
WEIGHT: 236 LBS | HEART RATE: 78 BPM | RESPIRATION RATE: 16 BRPM | BODY MASS INDEX: 32.68 KG/M2 | OXYGEN SATURATION: 98 % | HEIGHT: 71.18 IN | DIASTOLIC BLOOD PRESSURE: 72 MMHG | SYSTOLIC BLOOD PRESSURE: 110 MMHG

## 2022-05-02 DIAGNOSIS — Z13.29 SCREENING FOR ENDOCRINE, METABOLIC AND IMMUNITY DISORDER: ICD-10-CM

## 2022-05-02 DIAGNOSIS — C81.18 NODULAR SCLEROSIS HODGKIN LYMPHOMA OF LYMPH NODES OF MULTIPLE REGIONS (HCC): ICD-10-CM

## 2022-05-02 DIAGNOSIS — Z13.0 SCREENING FOR ENDOCRINE, METABOLIC AND IMMUNITY DISORDER: ICD-10-CM

## 2022-05-02 DIAGNOSIS — Z13.228 SCREENING FOR ENDOCRINE, METABOLIC AND IMMUNITY DISORDER: ICD-10-CM

## 2022-05-02 DIAGNOSIS — D64.9 ANEMIA, UNSPECIFIED TYPE: ICD-10-CM

## 2022-05-02 DIAGNOSIS — F33.1 MAJOR DEPRESSIVE DISORDER, RECURRENT, MODERATE (HCC): ICD-10-CM

## 2022-05-02 DIAGNOSIS — Z00.00 ANNUAL PHYSICAL EXAM: Primary | ICD-10-CM

## 2022-05-02 PROCEDURE — 3078F DIAST BP <80 MM HG: CPT | Performed by: FAMILY MEDICINE

## 2022-05-02 PROCEDURE — 3008F BODY MASS INDEX DOCD: CPT | Performed by: FAMILY MEDICINE

## 2022-05-02 PROCEDURE — 3074F SYST BP LT 130 MM HG: CPT | Performed by: FAMILY MEDICINE

## 2022-05-02 PROCEDURE — 99395 PREV VISIT EST AGE 18-39: CPT | Performed by: FAMILY MEDICINE

## 2022-05-26 ENCOUNTER — HOSPITAL ENCOUNTER (OUTPATIENT)
Dept: NUCLEAR MEDICINE | Facility: HOSPITAL | Age: 22
Discharge: HOME | End: 2022-05-26
Attending: SPECIALIST
Payer: COMMERCIAL

## 2022-05-26 ENCOUNTER — LAB ENCOUNTER (OUTPATIENT)
Dept: LAB | Age: 22
End: 2022-05-26
Attending: FAMILY MEDICINE
Payer: COMMERCIAL

## 2022-05-26 DIAGNOSIS — Z13.0 SCREENING FOR ENDOCRINE, METABOLIC AND IMMUNITY DISORDER: ICD-10-CM

## 2022-05-26 DIAGNOSIS — C81.18 NODULAR SCLEROSIS HODGKIN LYMPHOMA OF LYMPH NODES OF MULTIPLE REGIONS (HCC): ICD-10-CM

## 2022-05-26 DIAGNOSIS — Z13.29 SCREENING FOR ENDOCRINE, METABOLIC AND IMMUNITY DISORDER: ICD-10-CM

## 2022-05-26 DIAGNOSIS — Z13.228 SCREENING FOR ENDOCRINE, METABOLIC AND IMMUNITY DISORDER: ICD-10-CM

## 2022-05-26 LAB
ALBUMIN SERPL-MCNC: 4 G/DL (ref 3.4–5)
ALBUMIN/GLOB SERPL: 1.1 {RATIO} (ref 1–2)
ALP LIVER SERPL-CCNC: 66 U/L
ALT SERPL-CCNC: 50 U/L
ANION GAP SERPL CALC-SCNC: 7 MMOL/L (ref 0–18)
AST SERPL-CCNC: 24 U/L (ref 15–37)
BASOPHILS # BLD AUTO: 0.04 X10(3) UL (ref 0–0.2)
BASOPHILS NFR BLD AUTO: 0.7 %
BILIRUB SERPL-MCNC: 1.4 MG/DL (ref 0.1–2)
BUN BLD-MCNC: 19 MG/DL (ref 7–18)
CALCIUM BLD-MCNC: 9.4 MG/DL (ref 8.5–10.1)
CHLORIDE SERPL-SCNC: 106 MMOL/L (ref 98–112)
CHOLEST SERPL-MCNC: 147 MG/DL (ref ?–200)
CO2 SERPL-SCNC: 26 MMOL/L (ref 21–32)
CREAT BLD-MCNC: 1.05 MG/DL
EOSINOPHIL # BLD AUTO: 0.4 X10(3) UL (ref 0–0.7)
EOSINOPHIL NFR BLD AUTO: 7.5 %
ERYTHROCYTE [DISTWIDTH] IN BLOOD BY AUTOMATED COUNT: 13.9 %
FASTING PATIENT LIPID ANSWER: NO
FASTING STATUS PATIENT QL REPORTED: NO
GLOBULIN PLAS-MCNC: 3.6 G/DL (ref 2.8–4.4)
GLUCOSE BLD-MCNC: 115 MG/DL (ref 70–99)
GLUCOSE BLD-MCNC: 89 MG/DL (ref 70–99)
HCT VFR BLD AUTO: 44.9 %
HDLC SERPL-MCNC: 44 MG/DL (ref 40–59)
HGB BLD-MCNC: 15 G/DL
IMM GRANULOCYTES # BLD AUTO: 0.02 X10(3) UL (ref 0–1)
IMM GRANULOCYTES NFR BLD: 0.4 %
LDLC SERPL CALC-MCNC: 89 MG/DL (ref ?–100)
LYMPHOCYTES # BLD AUTO: 1.75 X10(3) UL (ref 1–4)
LYMPHOCYTES NFR BLD AUTO: 32.6 %
MCH RBC QN AUTO: 29.4 PG (ref 26–34)
MCHC RBC AUTO-ENTMCNC: 33.4 G/DL (ref 31–37)
MCV RBC AUTO: 87.9 FL
MONOCYTES # BLD AUTO: 0.3 X10(3) UL (ref 0.1–1)
MONOCYTES NFR BLD AUTO: 5.6 %
NEUTROPHILS # BLD AUTO: 2.85 X10 (3) UL (ref 1.5–7.7)
NEUTROPHILS # BLD AUTO: 2.85 X10(3) UL (ref 1.5–7.7)
NEUTROPHILS NFR BLD AUTO: 53.2 %
NONHDLC SERPL-MCNC: 103 MG/DL (ref ?–130)
OSMOLALITY SERPL CALC.SUM OF ELEC: 291 MOSM/KG (ref 275–295)
PLATELET # BLD AUTO: 230 10(3)UL (ref 150–450)
POTASSIUM SERPL-SCNC: 3.8 MMOL/L (ref 3.5–5.1)
PROT SERPL-MCNC: 7.6 G/DL (ref 6.4–8.2)
RBC # BLD AUTO: 5.11 X10(6)UL
SODIUM SERPL-SCNC: 139 MMOL/L (ref 136–145)
TRIGL SERPL-MCNC: 72 MG/DL (ref 30–149)
TSI SER-ACNC: 3.61 MIU/ML (ref 0.36–3.74)
VLDLC SERPL CALC-MCNC: 12 MG/DL (ref 0–30)
WBC # BLD AUTO: 5.4 X10(3) UL (ref 4–11)

## 2022-05-26 PROCEDURE — 82962 GLUCOSE BLOOD TEST: CPT

## 2022-05-26 PROCEDURE — 85025 COMPLETE CBC W/AUTO DIFF WBC: CPT

## 2022-05-26 PROCEDURE — 80053 COMPREHEN METABOLIC PANEL: CPT

## 2022-05-26 PROCEDURE — 36415 COLL VENOUS BLD VENIPUNCTURE: CPT

## 2022-05-26 PROCEDURE — 80061 LIPID PANEL: CPT

## 2022-05-26 PROCEDURE — 78815 PET IMAGE W/CT SKULL-THIGH: CPT | Performed by: SPECIALIST

## 2022-05-26 PROCEDURE — 84443 ASSAY THYROID STIM HORMONE: CPT

## 2022-06-01 ENCOUNTER — OFFICE VISIT (OUTPATIENT)
Dept: HEMATOLOGY/ONCOLOGY | Age: 22
End: 2022-06-01
Attending: CLINICAL NURSE SPECIALIST
Payer: COMMERCIAL

## 2022-06-01 VITALS
HEIGHT: 71.18 IN | OXYGEN SATURATION: 98 % | BODY MASS INDEX: 34.22 KG/M2 | TEMPERATURE: 97 F | HEART RATE: 75 BPM | WEIGHT: 247.13 LBS | SYSTOLIC BLOOD PRESSURE: 117 MMHG | DIASTOLIC BLOOD PRESSURE: 75 MMHG

## 2022-06-01 DIAGNOSIS — C81.18 NODULAR SCLEROSIS HODGKIN LYMPHOMA OF LYMPH NODES OF MULTIPLE REGIONS (HCC): Primary | ICD-10-CM

## 2022-06-01 PROCEDURE — 99214 OFFICE O/P EST MOD 30 MIN: CPT | Performed by: SPECIALIST

## 2022-06-03 ENCOUNTER — TELEPHONE (OUTPATIENT)
Dept: FAMILY MEDICINE CLINIC | Facility: CLINIC | Age: 22
End: 2022-06-03

## 2022-06-03 DIAGNOSIS — R73.03 PRE-DIABETES: Primary | ICD-10-CM

## 2022-06-03 NOTE — TELEPHONE ENCOUNTER
----- Message from Yuriy Rubio MD sent at 6/3/2022 11:32 AM CDT -----  Stable sugars but running a little bit high recheck CMP and add A1c in 6 months.   Check if patient would still like a transfer from his current oncologist to a different one

## 2022-06-03 NOTE — TELEPHONE ENCOUNTER
Spoke to patient results of labs given. You can access the FollowMyHealth Patient Portal offered by [facility] by registering at the following website: [email or web address]. By joining [employer]’s FollowMyHealth portal, you will also be able to view your health information using other applications (apps) compatible with our system.

## 2022-06-13 ENCOUNTER — LAB ENCOUNTER (OUTPATIENT)
Dept: LAB | Age: 22
End: 2022-06-13
Attending: SPECIALIST
Payer: COMMERCIAL

## 2022-06-13 DIAGNOSIS — C81.18 NODULAR SCLEROSIS HODGKIN LYMPHOMA OF LYMPH NODES OF MULTIPLE REGIONS (HCC): ICD-10-CM

## 2022-06-13 LAB — SARS-COV-2 RNA RESP QL NAA+PROBE: NOT DETECTED

## 2022-06-15 ENCOUNTER — HOSPITAL ENCOUNTER (OUTPATIENT)
Dept: INTERVENTIONAL RADIOLOGY/VASCULAR | Facility: HOSPITAL | Age: 22
Discharge: HOME | End: 2022-06-15
Attending: SPECIALIST | Admitting: SPECIALIST
Payer: COMMERCIAL

## 2022-06-15 VITALS
RESPIRATION RATE: 22 BRPM | HEIGHT: 70 IN | DIASTOLIC BLOOD PRESSURE: 64 MMHG | HEART RATE: 54 BPM | WEIGHT: 250 LBS | OXYGEN SATURATION: 96 % | BODY MASS INDEX: 35.79 KG/M2 | TEMPERATURE: 98 F | SYSTOLIC BLOOD PRESSURE: 116 MMHG

## 2022-06-15 DIAGNOSIS — C81.18 NODULAR SCLEROSIS HODGKIN LYMPHOMA OF LYMPH NODES OF MULTIPLE REGIONS (HCC): Primary | ICD-10-CM

## 2022-06-15 LAB — INR: 1 (ref 0.8–1.3)

## 2022-06-15 PROCEDURE — 85610 PROTHROMBIN TIME: CPT | Performed by: RADIOLOGY

## 2022-06-15 PROCEDURE — 99152 MOD SED SAME PHYS/QHP 5/>YRS: CPT | Performed by: RADIOLOGY

## 2022-06-15 PROCEDURE — 77001 FLUOROGUIDE FOR VEIN DEVICE: CPT | Performed by: RADIOLOGY

## 2022-06-15 PROCEDURE — 36590 REMOVAL TUNNELED CV CATH: CPT | Performed by: RADIOLOGY

## 2022-06-15 PROCEDURE — 0JPT0WZ REMOVAL OF TOTALLY IMPLANTABLE VASCULAR ACCESS DEVICE FROM TRUNK SUBCUTANEOUS TISSUE AND FASCIA, OPEN APPROACH: ICD-10-PCS | Performed by: RADIOLOGY

## 2022-06-15 RX ORDER — CHLORHEXIDINE GLUCONATE 4 G/100ML
30 SOLUTION TOPICAL ONCE
Status: COMPLETED | OUTPATIENT
Start: 2022-06-15 | End: 2022-06-15

## 2022-06-15 RX ORDER — CEFAZOLIN SODIUM/WATER 2 G/20 ML
SYRINGE (ML) INTRAVENOUS
Status: COMPLETED
Start: 2022-06-15 | End: 2022-06-15

## 2022-06-15 RX ORDER — LIDOCAINE HYDROCHLORIDE 10 MG/ML
INJECTION, SOLUTION INFILTRATION; PERINEURAL
Status: COMPLETED
Start: 2022-06-15 | End: 2022-06-15

## 2022-06-15 RX ORDER — VANCOMYCIN HYDROCHLORIDE 1 G/20ML
INJECTION, POWDER, LYOPHILIZED, FOR SOLUTION INTRAVENOUS
Status: COMPLETED
Start: 2022-06-15 | End: 2022-06-15

## 2022-06-15 RX ORDER — SODIUM CHLORIDE 9 MG/ML
INJECTION, SOLUTION INTRAVENOUS CONTINUOUS
Status: DISCONTINUED | OUTPATIENT
Start: 2022-06-15 | End: 2022-06-15

## 2022-06-15 RX ORDER — MIDAZOLAM HYDROCHLORIDE 1 MG/ML
INJECTION INTRAMUSCULAR; INTRAVENOUS
Status: COMPLETED
Start: 2022-06-15 | End: 2022-06-15

## 2022-06-15 RX ORDER — LIDOCAINE HYDROCHLORIDE AND EPINEPHRINE 10; 10 MG/ML; UG/ML
INJECTION, SOLUTION INFILTRATION; PERINEURAL
Status: COMPLETED
Start: 2022-06-15 | End: 2022-06-15

## 2022-06-15 RX ADMIN — CHLORHEXIDINE GLUCONATE 30 ML: 4 SOLUTION TOPICAL at 08:40:00

## 2022-06-15 RX ADMIN — SODIUM CHLORIDE: 9 INJECTION, SOLUTION INTRAVENOUS at 08:42:00

## 2022-06-15 NOTE — PLAN OF CARE
Patient had PAC removed today with Dr. Conchita Jensen. Right upper chest mepilex dressing in place, CDI. VSS. Patient denies any pain. Patient's mom is @ bedside. Patient tolerating po intake. Recovery time completed. Discharge instructions reviewed. IV D/C'd. Patient discharged to [facility] by wheelchair with belongings. Patient's mom is .

## 2022-07-09 ENCOUNTER — OFFICE VISIT (OUTPATIENT)
Dept: FAMILY MEDICINE CLINIC | Facility: CLINIC | Age: 22
End: 2022-07-09
Payer: COMMERCIAL

## 2022-07-09 ENCOUNTER — HOSPITAL ENCOUNTER (EMERGENCY)
Age: 22
Discharge: HOME | End: 2022-07-09
Attending: EMERGENCY MEDICINE
Payer: COMMERCIAL

## 2022-07-09 VITALS
DIASTOLIC BLOOD PRESSURE: 65 MMHG | SYSTOLIC BLOOD PRESSURE: 140 MMHG | RESPIRATION RATE: 16 BRPM | WEIGHT: 240 LBS | BODY MASS INDEX: 32.51 KG/M2 | OXYGEN SATURATION: 97 % | HEART RATE: 76 BPM | HEIGHT: 72 IN | TEMPERATURE: 98 F

## 2022-07-09 VITALS
SYSTOLIC BLOOD PRESSURE: 116 MMHG | BODY MASS INDEX: 32.51 KG/M2 | HEIGHT: 72 IN | RESPIRATION RATE: 16 BRPM | OXYGEN SATURATION: 98 % | TEMPERATURE: 99 F | HEART RATE: 85 BPM | WEIGHT: 240 LBS | DIASTOLIC BLOOD PRESSURE: 72 MMHG

## 2022-07-09 DIAGNOSIS — H60.311 ACUTE DIFFUSE OTITIS EXTERNA OF RIGHT EAR: Primary | ICD-10-CM

## 2022-07-09 DIAGNOSIS — H60.331 ACUTE SWIMMER'S EAR OF RIGHT SIDE: Primary | ICD-10-CM

## 2022-07-09 PROCEDURE — 3078F DIAST BP <80 MM HG: CPT | Performed by: NURSE PRACTITIONER

## 2022-07-09 PROCEDURE — 3008F BODY MASS INDEX DOCD: CPT | Performed by: NURSE PRACTITIONER

## 2022-07-09 PROCEDURE — 3074F SYST BP LT 130 MM HG: CPT | Performed by: NURSE PRACTITIONER

## 2022-07-09 PROCEDURE — 99283 EMERGENCY DEPT VISIT LOW MDM: CPT

## 2022-07-09 PROCEDURE — 99213 OFFICE O/P EST LOW 20 MIN: CPT | Performed by: NURSE PRACTITIONER

## 2022-07-09 RX ORDER — AMOXICILLIN 875 MG/1
875 TABLET, COATED ORAL 2 TIMES DAILY
Qty: 20 TABLET | Refills: 0 | Status: SHIPPED | OUTPATIENT
Start: 2022-07-09 | End: 2022-07-19

## 2022-07-09 RX ORDER — AMOXICILLIN 875 MG/1
875 TABLET, COATED ORAL 2 TIMES DAILY
Qty: 20 TABLET | Refills: 0 | Status: SHIPPED | OUTPATIENT
Start: 2022-07-09 | End: 2022-07-09

## 2022-07-09 RX ORDER — OFLOXACIN 3 MG/ML
10 SOLUTION AURICULAR (OTIC) DAILY
Qty: 1 EACH | Refills: 0 | Status: SHIPPED | OUTPATIENT
Start: 2022-07-09 | End: 2022-07-16

## 2022-07-10 NOTE — ED INITIAL ASSESSMENT (HPI)
Pt diagnosed with otitis externa and had an ear wick placed. Jorge Roger fell out and Pt unable to get drops in ear.

## 2022-07-12 ENCOUNTER — TELEPHONE (OUTPATIENT)
Dept: FAMILY MEDICINE CLINIC | Facility: CLINIC | Age: 22
End: 2022-07-12

## 2022-07-12 NOTE — TELEPHONE ENCOUNTER
Got phone call about patient having persistent ear pain was in ER for swimmer's ear and no improvement, ok to squeeze either tomorrow or Thursday to see me so I can take a look.

## 2022-07-12 NOTE — TELEPHONE ENCOUNTER
PSR's please squeeze this patient in for a visit tomorrow  Or Thursday per Dr Marla Kramer, as he has spoken to patient himself.      Jannette Vasquez!!

## 2023-01-27 ENCOUNTER — OFFICE VISIT (OUTPATIENT)
Dept: FAMILY MEDICINE CLINIC | Facility: CLINIC | Age: 23
End: 2023-01-27
Payer: COMMERCIAL

## 2023-01-27 VITALS — HEIGHT: 72 IN | BODY MASS INDEX: 34.95 KG/M2 | WEIGHT: 258 LBS

## 2023-01-27 DIAGNOSIS — R73.9 HYPERGLYCEMIA: ICD-10-CM

## 2023-01-27 DIAGNOSIS — Z85.71 PERSONAL HISTORY OF HODGKIN LYMPHOMA: ICD-10-CM

## 2023-01-27 DIAGNOSIS — R59.0 MEDIASTINAL LYMPHADENOPATHY: ICD-10-CM

## 2023-01-27 DIAGNOSIS — Z85.71 HISTORY OF NODULAR SCLEROSIS HODGKIN'S DISEASE: ICD-10-CM

## 2023-01-27 DIAGNOSIS — F33.1 MAJOR DEPRESSIVE DISORDER, RECURRENT, MODERATE (HCC): ICD-10-CM

## 2023-01-27 DIAGNOSIS — R68.82 LOW LIBIDO: Primary | ICD-10-CM

## 2023-01-27 DIAGNOSIS — Z31.41 FERTILITY TESTING: ICD-10-CM

## 2023-01-27 PROBLEM — I31.39 PERICARDIAL EFFUSION (HCC): Status: RESOLVED | Noted: 2021-11-06 | Resolved: 2023-01-27

## 2023-01-27 PROBLEM — I31.39 PERICARDIAL EFFUSION: Status: RESOLVED | Noted: 2021-11-06 | Resolved: 2023-01-27

## 2023-01-27 PROBLEM — J90 PLEURAL EFFUSION ON RIGHT: Status: RESOLVED | Noted: 2021-11-06 | Resolved: 2023-01-27

## 2023-01-27 PROBLEM — D64.9 ANEMIA, UNSPECIFIED TYPE: Status: RESOLVED | Noted: 2021-11-06 | Resolved: 2023-01-27

## 2023-01-27 PROBLEM — C81.18 NODULAR SCLEROSIS HODGKIN LYMPHOMA OF LYMPH NODES OF MULTIPLE REGIONS (HCC): Status: RESOLVED | Noted: 2021-11-12 | Resolved: 2023-01-27

## 2023-01-27 PROCEDURE — 99215 OFFICE O/P EST HI 40 MIN: CPT | Performed by: FAMILY MEDICINE

## 2023-01-27 PROCEDURE — 3008F BODY MASS INDEX DOCD: CPT | Performed by: FAMILY MEDICINE

## 2023-01-30 ENCOUNTER — LAB ENCOUNTER (OUTPATIENT)
Dept: LAB | Age: 23
End: 2023-01-30
Attending: FAMILY MEDICINE
Payer: COMMERCIAL

## 2023-01-30 DIAGNOSIS — R59.0 MEDIASTINAL LYMPHADENOPATHY: ICD-10-CM

## 2023-01-30 DIAGNOSIS — Z85.71 PERSONAL HISTORY OF HODGKIN LYMPHOMA: ICD-10-CM

## 2023-01-30 DIAGNOSIS — R68.82 LOW LIBIDO: ICD-10-CM

## 2023-01-30 DIAGNOSIS — R73.9 HYPERGLYCEMIA: ICD-10-CM

## 2023-01-30 LAB
ALBUMIN SERPL-MCNC: 4.3 G/DL (ref 3.4–5)
ALBUMIN/GLOB SERPL: 1.2 {RATIO} (ref 1–2)
ALP LIVER SERPL-CCNC: 65 U/L
ALT SERPL-CCNC: 45 U/L
ANION GAP SERPL CALC-SCNC: 4 MMOL/L (ref 0–18)
AST SERPL-CCNC: 20 U/L (ref 15–37)
BASOPHILS # BLD AUTO: 0.06 X10(3) UL (ref 0–0.2)
BASOPHILS NFR BLD AUTO: 0.9 %
BILIRUB SERPL-MCNC: 1.5 MG/DL (ref 0.1–2)
BUN BLD-MCNC: 18 MG/DL (ref 7–18)
CALCIUM BLD-MCNC: 9.8 MG/DL (ref 8.5–10.1)
CHLORIDE SERPL-SCNC: 105 MMOL/L (ref 98–112)
CO2 SERPL-SCNC: 28 MMOL/L (ref 21–32)
CREAT BLD-MCNC: 1.22 MG/DL
EOSINOPHIL # BLD AUTO: 0.34 X10(3) UL (ref 0–0.7)
EOSINOPHIL NFR BLD AUTO: 5.2 %
ERYTHROCYTE [DISTWIDTH] IN BLOOD BY AUTOMATED COUNT: 12.7 %
EST. AVERAGE GLUCOSE BLD GHB EST-MCNC: 120 MG/DL (ref 68–126)
FASTING STATUS PATIENT QL REPORTED: YES
FOLATE SERPL-MCNC: 7.8 NG/ML (ref 8.7–?)
GFR SERPLBLD BASED ON 1.73 SQ M-ARVRAT: 85 ML/MIN/1.73M2 (ref 60–?)
GLOBULIN PLAS-MCNC: 3.6 G/DL (ref 2.8–4.4)
GLUCOSE BLD-MCNC: 107 MG/DL (ref 70–99)
HBA1C MFR BLD: 5.8 % (ref ?–5.7)
HCT VFR BLD AUTO: 50.6 %
HGB BLD-MCNC: 16.9 G/DL
IMM GRANULOCYTES # BLD AUTO: 0.02 X10(3) UL (ref 0–1)
IMM GRANULOCYTES NFR BLD: 0.3 %
LYMPHOCYTES # BLD AUTO: 2.35 X10(3) UL (ref 1–4)
LYMPHOCYTES NFR BLD AUTO: 36.3 %
MCH RBC QN AUTO: 28.8 PG (ref 26–34)
MCHC RBC AUTO-ENTMCNC: 33.4 G/DL (ref 31–37)
MCV RBC AUTO: 86.2 FL
MONOCYTES # BLD AUTO: 0.37 X10(3) UL (ref 0.1–1)
MONOCYTES NFR BLD AUTO: 5.7 %
NEUTROPHILS # BLD AUTO: 3.34 X10 (3) UL (ref 1.5–7.7)
NEUTROPHILS # BLD AUTO: 3.34 X10(3) UL (ref 1.5–7.7)
NEUTROPHILS NFR BLD AUTO: 51.6 %
OSMOLALITY SERPL CALC.SUM OF ELEC: 286 MOSM/KG (ref 275–295)
PLATELET # BLD AUTO: 219 10(3)UL (ref 150–450)
POTASSIUM SERPL-SCNC: 3.9 MMOL/L (ref 3.5–5.1)
PROT SERPL-MCNC: 7.9 G/DL (ref 6.4–8.2)
RBC # BLD AUTO: 5.87 X10(6)UL
SODIUM SERPL-SCNC: 137 MMOL/L (ref 136–145)
T4 FREE SERPL-MCNC: 1.1 NG/DL (ref 0.8–1.7)
TSI SER-ACNC: 3.16 MIU/ML (ref 0.36–3.74)
VIT B12 SERPL-MCNC: 489 PG/ML (ref 193–986)
WBC # BLD AUTO: 6.5 X10(3) UL (ref 4–11)

## 2023-01-30 PROCEDURE — 80050 GENERAL HEALTH PANEL: CPT | Performed by: FAMILY MEDICINE

## 2023-01-30 PROCEDURE — 82746 ASSAY OF FOLIC ACID SERUM: CPT | Performed by: FAMILY MEDICINE

## 2023-01-30 PROCEDURE — 83036 HEMOGLOBIN GLYCOSYLATED A1C: CPT | Performed by: FAMILY MEDICINE

## 2023-01-30 PROCEDURE — 84402 ASSAY OF FREE TESTOSTERONE: CPT | Performed by: FAMILY MEDICINE

## 2023-01-30 PROCEDURE — 82607 VITAMIN B-12: CPT | Performed by: FAMILY MEDICINE

## 2023-01-30 PROCEDURE — 84439 ASSAY OF FREE THYROXINE: CPT | Performed by: FAMILY MEDICINE

## 2023-01-30 PROCEDURE — 84403 ASSAY OF TOTAL TESTOSTERONE: CPT | Performed by: FAMILY MEDICINE

## 2023-02-04 LAB
SEX HORMONE BINDING GLOBULIN: 19 NMOL/L
TESTOSTERONE -MS, BIOAVAILAB: 252.1 NG/DL
TESTOSTERONE, -MS/MS: 375 NG/DL
TESTOSTERONE, FREE -MS/MS: 83.3 PG/ML

## 2023-02-08 ENCOUNTER — TELEPHONE (OUTPATIENT)
Dept: FAMILY MEDICINE CLINIC | Facility: CLINIC | Age: 23
End: 2023-02-08

## 2023-02-08 DIAGNOSIS — E53.8 LOW FOLIC ACID: Primary | ICD-10-CM

## 2023-02-08 DIAGNOSIS — R79.89 LOW TESTOSTERONE IN MALE: ICD-10-CM

## 2023-02-08 NOTE — TELEPHONE ENCOUNTER
----- Message from Raimundo Mueller MD sent at 2/8/2023 10:08 AM CST -----   Folate's low- need OTC folic acid, [id number] q daily, recheck folate in 6 months. Low normal T levels, ok to refer to [facility].        mychart to pt, lab in system and referral in system

## 2023-07-21 ENCOUNTER — OFFICE VISIT (OUTPATIENT)
Dept: FAMILY MEDICINE CLINIC | Facility: CLINIC | Age: 23
End: 2023-07-21
Payer: COMMERCIAL

## 2023-07-21 VITALS
SYSTOLIC BLOOD PRESSURE: 126 MMHG | BODY MASS INDEX: 35.21 KG/M2 | RESPIRATION RATE: 18 BRPM | DIASTOLIC BLOOD PRESSURE: 70 MMHG | HEART RATE: 63 BPM | HEIGHT: 72 IN | WEIGHT: 260 LBS | TEMPERATURE: 98 F | OXYGEN SATURATION: 98 %

## 2023-07-21 DIAGNOSIS — Z85.71 HISTORY OF NODULAR SCLEROSIS HODGKIN'S DISEASE: ICD-10-CM

## 2023-07-21 DIAGNOSIS — Z00.00 ANNUAL PHYSICAL EXAM: Primary | ICD-10-CM

## 2023-07-21 DIAGNOSIS — F51.02 ADJUSTMENT INSOMNIA: ICD-10-CM

## 2023-07-21 DIAGNOSIS — Z85.71 PERSONAL HISTORY OF HODGKIN LYMPHOMA: ICD-10-CM

## 2023-07-21 PROCEDURE — 99395 PREV VISIT EST AGE 18-39: CPT | Performed by: FAMILY MEDICINE

## 2023-07-21 PROCEDURE — 3078F DIAST BP <80 MM HG: CPT | Performed by: FAMILY MEDICINE

## 2023-07-21 PROCEDURE — 3074F SYST BP LT 130 MM HG: CPT | Performed by: FAMILY MEDICINE

## 2023-07-21 PROCEDURE — 99214 OFFICE O/P EST MOD 30 MIN: CPT | Performed by: FAMILY MEDICINE

## 2023-07-21 PROCEDURE — 3008F BODY MASS INDEX DOCD: CPT | Performed by: FAMILY MEDICINE

## 2023-07-21 RX ORDER — TRAZODONE HYDROCHLORIDE 50 MG/1
50 TABLET ORAL NIGHTLY
Qty: 30 TABLET | Refills: 3 | Status: SHIPPED | OUTPATIENT
Start: 2023-07-21

## 2023-10-04 ENCOUNTER — TELEPHONE (OUTPATIENT)
Dept: FAMILY MEDICINE CLINIC | Facility: CLINIC | Age: 23
End: 2023-10-04

## 2023-10-04 NOTE — TELEPHONE ENCOUNTER
Pt is requesting an appt for the following vaccinations as he needs them for school. Please advice as pt wants to know if he is due for. Dtap,Tdap,And Td Vaccines. Pt is available this week on 10/05-10/06 since he will be off school.  Please advice

## 2023-10-04 NOTE — TELEPHONE ENCOUNTER
Last Physical: 7/21/2023  Last Tdap: 5/14/2011    OK to schedule nurse visit for Tdap vaccine. Thank you.

## 2023-10-05 ENCOUNTER — NURSE ONLY (OUTPATIENT)
Dept: FAMILY MEDICINE CLINIC | Facility: CLINIC | Age: 23
End: 2023-10-05
Payer: COMMERCIAL

## 2023-10-05 DIAGNOSIS — Z23 NEED FOR VACCINATION: ICD-10-CM

## 2023-10-05 DIAGNOSIS — Z23 NEED FOR TETANUS BOOSTER: Primary | ICD-10-CM

## 2023-10-05 PROCEDURE — 90472 IMMUNIZATION ADMIN EACH ADD: CPT | Performed by: FAMILY MEDICINE

## 2023-10-05 PROCEDURE — 90686 IIV4 VACC NO PRSV 0.5 ML IM: CPT | Performed by: FAMILY MEDICINE

## 2023-10-05 PROCEDURE — 90471 IMMUNIZATION ADMIN: CPT | Performed by: FAMILY MEDICINE

## 2023-12-05 ENCOUNTER — OFFICE VISIT (OUTPATIENT)
Facility: LOCATION | Age: 23
End: 2023-12-05

## 2023-12-05 VITALS — SYSTOLIC BLOOD PRESSURE: 120 MMHG | WEIGHT: 263 LBS | BODY MASS INDEX: 36 KG/M2 | DIASTOLIC BLOOD PRESSURE: 66 MMHG

## 2023-12-05 DIAGNOSIS — R73.03 PREDIABETES: Primary | ICD-10-CM

## 2023-12-05 DIAGNOSIS — E11.21 DIABETIC NEPHROPATHY ASSOCIATED WITH TYPE 2 DIABETES MELLITUS (HCC): ICD-10-CM

## 2023-12-05 DIAGNOSIS — E78.2 MIXED HYPERLIPIDEMIA: ICD-10-CM

## 2023-12-05 DIAGNOSIS — R53.83 FATIGUE, UNSPECIFIED TYPE: ICD-10-CM

## 2023-12-05 DIAGNOSIS — G47.00 INSOMNIA, UNSPECIFIED TYPE: ICD-10-CM

## 2023-12-05 DIAGNOSIS — R79.89 LOW VITAMIN D LEVEL: ICD-10-CM

## 2023-12-05 DIAGNOSIS — R68.82 LOW LIBIDO: ICD-10-CM

## 2023-12-05 DIAGNOSIS — E11.42 DIABETIC PERIPHERAL NEUROPATHY (HCC): ICD-10-CM

## 2023-12-05 DIAGNOSIS — E07.9 THYROID DISEASE: ICD-10-CM

## 2023-12-05 PROCEDURE — 3078F DIAST BP <80 MM HG: CPT | Performed by: INTERNAL MEDICINE

## 2023-12-05 PROCEDURE — 3074F SYST BP LT 130 MM HG: CPT | Performed by: INTERNAL MEDICINE

## 2023-12-05 PROCEDURE — 99245 OFF/OP CONSLTJ NEW/EST HI 55: CPT | Performed by: INTERNAL MEDICINE

## 2023-12-05 NOTE — PROGRESS NOTES
New Patient Evaluation - History and Physical    CONSULT - Reason for Visit:  preDM/low testo. Requesting Physician: Philip Marquez MD      CHIEF COMPLAINT:    Chief Complaint   Patient presents with    Consult     Consult for:  testosterone levels low energy. Philip Barrientos HISTORY OF PRESENT ILLNESS:   Cody Thibodeaux is a 24year old male who presents with low T/preDM    Has hx of Nodular sclerosis Hodgkin lymphoma of lymph nodes of multiple regions  s/p chemo    Since chemo 4/2022 he has had low testosterone per pt   Low energey   Used to be wt   Does nothave the same drive   Low libido   Thought process is slow now/blurru     He had the discussion with PCP. Erection is softer  Testicles are smaller also   The patient endorses the underlined symptoms: Decreased ability to play sports, falling asleep after dinner, change in mood, sad, does not enjoy life or the same activities as before, change in body hair and facial hair, hot flashes, heat/cold intolerance, skin tags, excessive weight gain, Height loss, Hands/shoe/hat size, gynecomastia, galactorrhea, Headache, Change in vision. 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 Steroid no  Anabolic steroids no  Previous testosterone or \"supplements\" no  Head trauma no  Infection (mumps) or trauma to the testicles no  Sleep: has insomnia, takes melatonin 30 mg   Gets up at 1030 -36  Goes to bed and cannot sleep for ~ 2-3 hrs      ASSESSMENT AND PLAN:  23 yo man with lymphoma s/p chemo  C/o  low energy, low libido, and irregular sleep cycle. We discussed w/ in length today.    He will work on sleep hygiene, wt loss, and repeat labs   D/w pt how to take melatonin     plan  AM labs after good night sleep   Will discuss result in 1 mo   Will refer to nutritionist   Will give printout about sleep hygiene [email or web address]          PAST MEDICAL HISTORY:   Past Medical History:   Diagnosis Date    Anxiety     Anxiety state     Cancer ([facility] [address]     lymphoma    Depression     d/c'd medications    Muscle weakness      Nodular sclerosis Hodgkin lymphoma of lymph nodes of multiple regions     PAST SURGICAL HISTORY:   Past Surgical History:   Procedure Laterality Date    BIOPSY         CURRENT MEDICATIONS:    Current Outpatient Medications   Medication Sig Dispense Refill    traZODone 50 MG Oral Tab Take 1 tablet (50 mg total) by mouth nightly. 30 tablet 3       ALLERGIES:  No Known Allergies    SOCIAL HISTORY:    Social History     Socioeconomic History    Marital status: Single   Tobacco Use    Smoking status: Former     Types: Cigars    Smokeless tobacco: Never   Vaping Use    Vaping Use: Never used   Substance and Sexual Activity    Alcohol use: Yes     Alcohol/week: 0.0 standard drinks of alcohol     Comment: social drinker    Drug use: No   Other Topics Concern    Caffeine Concern No    Exercise No    Seat Belt Yes    Special Diet No    Stress Concern Yes    Weight Concern Yes     Comment: lost 50+ pounds in three months       FAMILY HISTORY:   Family History   Problem Relation Age of Onset    Diabetes Father     Other (Other) Father     Anxiety Mother     Depression Mother     Depression Maternal Grandmother     Substance Abuse Maternal Grandfather     Depression Maternal Grandfather     Anxiety Maternal Grandfather     Substance Abuse Paternal Grandfather    Mother with hypothyroid        REVIEW OF SYSTEMS:  Has joint pain  All negative other than HPI      PHYSICAL EXAM:   Height: --  Weight: 263 lb (119.3 kg) (12/05 1104)  BSA (Calculated - sq m): --  Pulse: --  BP: --  Temp: --  Do Not Use - Resp Rate: --  SpO2: --      CONSTITUTIONAL:  Awake and alert.  Age appropriate, good hygiene not in acute distress. Well nourished and well developed. no acute distress   PSYCH:   Orientated to time, place, person & situation, Normal mood and affect, memory intact, normal insight and judgment, cooperative  Neuro: speech is clear. Awake, alert, no aphasia, no facial asymmetry, no nuchal rigidity  EYES:  No proptosis, no ptosis, conjunctiva normal  ENT:  Normocephalic, atraumatic  Eye: EOMI, normal lids, no discharge, no conjunctival erythema. No exophthalmos/proptosis, Ptosis negative   No rhinorrhea, moist oral mucosa  Neck: full range of motion  Neck/Thyroid: neck inspection: normal, No scar, No goiter   LUNGS:  No acute respiratory distress, non-labored respiration. Speaking full sentences  CARDIOVASCULAR:  regular rate   ABDOMEN:  No abdominal pain. obese  SKIN:  no bruising or bleeding, no rashes and no lesions, Skin is dry, no obvious rashes or lesions  EXTREMITIES: no gross abnormality   MSK: Moves extremities spontaneously.  full range of motion in all major joints      DATA:     Pertinent data reviewed    Orders Placed This Encounter   Procedures    Comp Metabolic Panel (14)    CBC With Differential With Platelet    HCG, Beta Subunit (Quant Pregnancy Test)    TSH and Free T4    Hemoglobin A1C    FSH    LH (Luteinizing Hormone)    Testosterone Total    Testosterone, Bioavail, SHBG (Male)    Lipid Panel    Prolactin    Iron and TIBC       12/5/2023  Sharlene Ring MD

## 2023-12-05 NOTE — PATIENT INSTRUCTIONS
AM labs after good night sleep   Will discuss result in 1 mo   Will refer to nutritionist   Will give printout about sleep hygiene   [email or web address]

## 2024-02-20 ENCOUNTER — OFFICE VISIT (OUTPATIENT)
Dept: FAMILY MEDICINE CLINIC | Facility: CLINIC | Age: 24
End: 2024-02-20
Payer: COMMERCIAL

## 2024-02-20 VITALS
BODY MASS INDEX: 35.35 KG/M2 | HEART RATE: 70 BPM | SYSTOLIC BLOOD PRESSURE: 138 MMHG | RESPIRATION RATE: 12 BRPM | OXYGEN SATURATION: 96 % | DIASTOLIC BLOOD PRESSURE: 84 MMHG | HEIGHT: 72 IN | WEIGHT: 261 LBS

## 2024-02-20 DIAGNOSIS — F98.8 ATTENTION DEFICIT DISORDER (ADD) WITHOUT HYPERACTIVITY: ICD-10-CM

## 2024-02-20 DIAGNOSIS — Z85.71 HISTORY OF NODULAR SCLEROSIS HODGKIN'S DISEASE: ICD-10-CM

## 2024-02-20 DIAGNOSIS — F43.23 ADJUSTMENT REACTION WITH ANXIETY AND DEPRESSION: ICD-10-CM

## 2024-02-20 DIAGNOSIS — Z30.09 FAMILY PLANNING COUNSELING: Primary | ICD-10-CM

## 2024-02-20 PROCEDURE — 3008F BODY MASS INDEX DOCD: CPT | Performed by: FAMILY MEDICINE

## 2024-02-20 PROCEDURE — 99215 OFFICE O/P EST HI 40 MIN: CPT | Performed by: FAMILY MEDICINE

## 2024-02-20 PROCEDURE — 3075F SYST BP GE 130 - 139MM HG: CPT | Performed by: FAMILY MEDICINE

## 2024-02-20 PROCEDURE — 3079F DIAST BP 80-89 MM HG: CPT | Performed by: FAMILY MEDICINE

## 2024-02-20 RX ORDER — ERGOCALCIFEROL 1.25 MG/1
50000 CAPSULE ORAL WEEKLY
Qty: 12 CAPSULE | Refills: 1 | Status: SHIPPED | OUTPATIENT
Start: 2024-02-20 | End: 2024-05-20

## 2024-02-24 NOTE — PROGRESS NOTES
HPI:    Rudy Olivares is a 23 year old male who presents for Fertility (Pt states he needs to know if viable sperm before freezing due to cost.)     Presenting for a wellness visit has a history of Hodgkin's lymphoma needs to follow-up with oncology would like a different referral for oncologist outside of [facility] system. Patient reports hard time falling asleep and cannot stay asleep for prolonged. He reports no acute anxiety or depression but just has a hard time shutting down at nighttime.   Patient does have history of ADD with depression as kid, was on medication for depression, and after further questioning he reports some s/s of depression with ADD s/s.   Patient was previously on medication for ADD and depression but prefers to stay off any type of medication does have a history of Hodgkin's lymphoma needs follow-up with oncology he reports no new complaints energy levels have been good has struggles with motivation to get certain things done throughout the day has generalized fatigue with questions about freezing his sperm family-planning semen analysis etc.    Past History:   He  has a past medical history of Anxiety, Anxiety state, Cancer (HCC), Depression, and Muscle weakness.   He  has a past surgical history that includes biopsy.   His family history includes Anxiety in his maternal grandfather and mother; Depression in his maternal grandfather, maternal grandmother, and mother; Diabetes in his father; Other in his father; Substance Abuse in his maternal grandfather and paternal grandfather.   He  reports that he has quit smoking. His smoking use included cigars. He has never used smokeless tobacco. He reports current alcohol use. He reports that he does not use drugs.     He is not on any long-term medications.   He has No Known Allergies.     No current outpatient medications on file prior to visit.     No current facility-administered medications on file prior to visit.         REVIEW OF  SYSTEMS:   Patient denies shortness of breath, denies chest pain and denies any recent fevers or chills.    Patient reports no urinary complaints and denies headaches or visual disturbances.   Patient denies any abdominal pain at this time. Patient has no new skin lesions.  Patient reports no acute back pain and reports no dizziness or headaches.   Patient reports no visual disturbances and reports hearing has been about the same.   Patient reports no recent injury or trauma.               EXAM:    /84   Pulse 70   Resp 12   Ht 6' (1.829 m)   Wt 261 lb (118.4 kg)   SpO2 96%   BMI 35.40 kg/m²  Estimated body mass index is 35.4 kg/m² as calculated from the following:    Height as of this encounter: 6' (1.829 m).    Weight as of this encounter: 261 lb (118.4 kg).    General Appearance:  Alert, cooperative, no distress, appears stated age   Head:  Normocephalic, without obvious abnormality, atraumatic   Eyes:  conjunctiva/cornea is not erythematous.        Nose: No nasal drainage.    Throat: No erythema    Neck: Supple, symmetrical, trachea midline, and normal ROM  thyroid: no obvious nodules   Back:   Symmetric, no curvature, ROM normal, no CVA tenderness   Lungs:   Clear to auscultation bilaterally, respirations unlabored   Chest Wall:  No tenderness or deformity   Heart:  Regular rate and rhythm, S1, S2 normal, no murmur,   Abdomen:   Soft, non-tender, bowel sounds active. No hernia.    Genitalia:     Rectal:     Extremities: Extremities normal, atraumatic, no cyanosis or edema   Pulses: 2+ and symmetric   Skin: Skin color, texture, turgor normal, no new rashes    Lymph nodes: No obvious cervical adenopathy.    Neurologic and psych: Normal speech, Alert and oriented x 3.   Normal mood, normal insight and judgment.    Stable anxiety with more depressed mood with ADD s/s.                 ASSESSMENT AND PLAN:   1. Family planning counseling-has concerns about fertility due to have chemo and treatment for  hodgkin's  -will place as below:  - Fertility Semen Analysis [E]; Future    2. Attention deficit disorder (ADD) without hyperactivity  -will refer for counseling.   - [facility] - In Network    3. Adjustment reaction with anxiety and depression  -as above. CPM.   - [facility] Referral - In Network      -history of nodular sclerosis Hodgkin's disease-in remission, but needs follow up with Oncology.       Pt verbalized understanding and has no further questions at this time.  Over 45 minutes was spent with patient reviewing medication, reviewing labs, reviewed previous hodgkin's records, reviewed ADD vs. Depression and previous psych history as kid and reviewed medical plan.    Greater than 50% of visit spent on education and counseling.  Office Follow up visit: 3 months, sooner prn.    Jony Beltran MD, 2/24/2024, 10:04 AM     Note to patient: The 21st Century Cures Act makes medical notes like these available to patients in the interest of transparency. However, this is a medical document intended as peer to peer communication. It is written in medical language and may contain abbreviations or verbiage that are unfamiliar. It may appear blunt or direct. Medical documents are intended to carry relevant information, facts as evident, and the clinical opinion of the practitioner who signs the document.

## 2024-03-02 ENCOUNTER — PATIENT MESSAGE (OUTPATIENT)
Dept: FAMILY MEDICINE CLINIC | Facility: CLINIC | Age: 24
End: 2024-03-02

## 2024-03-04 NOTE — TELEPHONE ENCOUNTER
From: Rudy Olivares  To: Jony Beltran  Sent: 3/2/2024 1:57 PM CST  Subject: Inquiry    Reji Mazariegos I was wondering how you would like me to complete my semen analysis? I don't have a sterile cannister to put it into, and I don't know who to give it to? Could you assist me with this?     Thanks!

## 2024-03-07 ENCOUNTER — TELEPHONE (OUTPATIENT)
Dept: FAMILY MEDICINE CLINIC | Facility: CLINIC | Age: 24
End: 2024-03-07

## 2024-03-07 NOTE — TELEPHONE ENCOUNTER
Jony reddy MD Bram, Jeffrey S, [facility]; [facility] Clinical Staff  Thank you Jffry!    [facility] triage: Patient needs an appointment to see me to discuss treatment for ADD.    Thank you!    Called pt and left msg asking him to call office to schedule appt with Dr Beltran

## 2024-04-25 ENCOUNTER — TELEPHONE (OUTPATIENT)
Dept: FAMILY MEDICINE CLINIC | Facility: CLINIC | Age: 24
End: 2024-04-25

## 2024-04-25 DIAGNOSIS — F98.8 ATTENTION DEFICIT DISORDER, UNSPECIFIED HYPERACTIVITY PRESENCE: ICD-10-CM

## 2024-04-25 NOTE — TELEPHONE ENCOUNTER
Pharmacy had a glitch in computer system and is requesting a new RX for VYVANSE 20 MG Oral Cap so that it can be processed for pt.

## 2024-04-27 RX ORDER — LISDEXAMFETAMINE DIMESYLATE 20 MG/1
20 CAPSULE ORAL DAILY
Qty: 30 CAPSULE | Refills: 0 | Status: SHIPPED | OUTPATIENT
Start: 2024-04-27 | End: 2024-05-27

## 2024-06-04 ENCOUNTER — PATIENT MESSAGE (OUTPATIENT)
Dept: FAMILY MEDICINE CLINIC | Facility: CLINIC | Age: 24
End: 2024-06-04

## 2024-06-04 DIAGNOSIS — F98.8 ATTENTION DEFICIT DISORDER, UNSPECIFIED HYPERACTIVITY PRESENCE: ICD-10-CM

## 2024-06-04 NOTE — TELEPHONE ENCOUNTER
From: Rudy Olivares  To: Jony Beltran  Sent: 6/4/2024 9:19 AM CDT  Subject: Clarification about medication     Is it possible that I can on a different medication like generic Vyvanse or something else that has the same effects? I enjoyed the way the generic Vyvanse made me feel as well as my concentration. I am hoping you can give me a prescription to a different drug.

## 2024-06-05 NOTE — TELEPHONE ENCOUNTER
Patient states that generic Vyvanse is hard to find    Asking if either can have Brand Vyvanse sent in or change to adderall?

## 2024-06-07 RX ORDER — LISDEXAMFETAMINE DIMESYLATE CAPSULES 20 MG/1
20 CAPSULE ORAL DAILY
Qty: 30 CAPSULE | Refills: 0 | Status: SHIPPED | OUTPATIENT
Start: 2024-06-07 | End: 2024-06-07

## 2024-06-09 RX ORDER — LISDEXAMFETAMINE DIMESYLATE CAPSULES 20 MG/1
20 CAPSULE ORAL DAILY
Qty: 30 CAPSULE | Refills: 0 | Status: SHIPPED | OUTPATIENT
Start: 2024-06-09 | End: 2024-07-09

## 2024-09-26 ENCOUNTER — OFFICE VISIT (OUTPATIENT)
Dept: FAMILY MEDICINE CLINIC | Facility: CLINIC | Age: 24
End: 2024-09-26
Payer: COMMERCIAL

## 2024-09-26 ENCOUNTER — LAB ENCOUNTER (OUTPATIENT)
Dept: LAB | Age: 24
End: 2024-09-26
Attending: FAMILY MEDICINE
Payer: COMMERCIAL

## 2024-09-26 VITALS
BODY MASS INDEX: 35.67 KG/M2 | OXYGEN SATURATION: 97 % | HEIGHT: 72 IN | WEIGHT: 263.38 LBS | TEMPERATURE: 98 F | SYSTOLIC BLOOD PRESSURE: 138 MMHG | RESPIRATION RATE: 18 BRPM | HEART RATE: 72 BPM | DIASTOLIC BLOOD PRESSURE: 70 MMHG

## 2024-09-26 DIAGNOSIS — F51.02 ADJUSTMENT INSOMNIA: ICD-10-CM

## 2024-09-26 DIAGNOSIS — R73.03 PRE-DIABETES: ICD-10-CM

## 2024-09-26 DIAGNOSIS — Z85.71 HISTORY OF NODULAR SCLEROSIS HODGKIN'S DISEASE: ICD-10-CM

## 2024-09-26 DIAGNOSIS — Z00.00 ANNUAL PHYSICAL EXAM: Primary | ICD-10-CM

## 2024-09-26 DIAGNOSIS — F90.9 ATTENTION DEFICIT HYPERACTIVITY DISORDER (ADHD), UNSPECIFIED ADHD TYPE: ICD-10-CM

## 2024-09-26 DIAGNOSIS — F33.1 MAJOR DEPRESSIVE DISORDER, RECURRENT, MODERATE (HCC): ICD-10-CM

## 2024-09-26 LAB
ALBUMIN SERPL-MCNC: 4.7 G/DL (ref 3.2–4.8)
ALBUMIN/GLOB SERPL: 1.6 {RATIO} (ref 1–2)
ALP LIVER SERPL-CCNC: 59 U/L
ALT SERPL-CCNC: 43 U/L
ANION GAP SERPL CALC-SCNC: 8 MMOL/L (ref 0–18)
AST SERPL-CCNC: 29 U/L (ref ?–34)
BASOPHILS # BLD AUTO: 0.05 X10(3) UL (ref 0–0.2)
BASOPHILS NFR BLD AUTO: 0.8 %
BILIRUB SERPL-MCNC: 2.1 MG/DL (ref 0.3–1.2)
BUN BLD-MCNC: 13 MG/DL (ref 9–23)
CALCIUM BLD-MCNC: 10 MG/DL (ref 8.7–10.4)
CHLORIDE SERPL-SCNC: 108 MMOL/L (ref 98–112)
CHOLEST SERPL-MCNC: 158 MG/DL (ref ?–200)
CO2 SERPL-SCNC: 23 MMOL/L (ref 21–32)
CREAT BLD-MCNC: 1.14 MG/DL
EGFRCR SERPLBLD CKD-EPI 2021: 92 ML/MIN/1.73M2 (ref 60–?)
EOSINOPHIL # BLD AUTO: 0.5 X10(3) UL (ref 0–0.7)
EOSINOPHIL NFR BLD AUTO: 8.1 %
ERYTHROCYTE [DISTWIDTH] IN BLOOD BY AUTOMATED COUNT: 13.2 %
EST. AVERAGE GLUCOSE BLD GHB EST-MCNC: 108 MG/DL (ref 68–126)
FASTING PATIENT LIPID ANSWER: NO
FASTING STATUS PATIENT QL REPORTED: NO
GLOBULIN PLAS-MCNC: 2.9 G/DL (ref 2–3.5)
GLUCOSE BLD-MCNC: 107 MG/DL (ref 70–99)
HBA1C MFR BLD: 5.4 % (ref ?–5.7)
HCT VFR BLD AUTO: 45.9 %
HDLC SERPL-MCNC: 39 MG/DL (ref 40–59)
HGB BLD-MCNC: 15.8 G/DL
IMM GRANULOCYTES # BLD AUTO: 0.01 X10(3) UL (ref 0–1)
IMM GRANULOCYTES NFR BLD: 0.2 %
LDLC SERPL CALC-MCNC: 103 MG/DL (ref ?–100)
LYMPHOCYTES # BLD AUTO: 2.41 X10(3) UL (ref 1–4)
LYMPHOCYTES NFR BLD AUTO: 39.2 %
MCH RBC QN AUTO: 29.2 PG (ref 26–34)
MCHC RBC AUTO-ENTMCNC: 34.4 G/DL (ref 31–37)
MCV RBC AUTO: 84.8 FL
MONOCYTES # BLD AUTO: 0.41 X10(3) UL (ref 0.1–1)
MONOCYTES NFR BLD AUTO: 6.7 %
NEUTROPHILS # BLD AUTO: 2.77 X10 (3) UL (ref 1.5–7.7)
NEUTROPHILS # BLD AUTO: 2.77 X10(3) UL (ref 1.5–7.7)
NEUTROPHILS NFR BLD AUTO: 45 %
NONHDLC SERPL-MCNC: 119 MG/DL (ref ?–130)
OSMOLALITY SERPL CALC.SUM OF ELEC: 289 MOSM/KG (ref 275–295)
PLATELET # BLD AUTO: 229 10(3)UL (ref 150–450)
POTASSIUM SERPL-SCNC: 4.1 MMOL/L (ref 3.5–5.1)
PROT SERPL-MCNC: 7.6 G/DL (ref 5.7–8.2)
RBC # BLD AUTO: 5.41 X10(6)UL
SODIUM SERPL-SCNC: 139 MMOL/L (ref 136–145)
TRIGL SERPL-MCNC: 86 MG/DL (ref 30–149)
TSI SER-ACNC: 1.88 MIU/ML (ref 0.55–4.78)
VLDLC SERPL CALC-MCNC: 14 MG/DL (ref 0–30)
WBC # BLD AUTO: 6.2 X10(3) UL (ref 4–11)

## 2024-09-26 PROCEDURE — 83036 HEMOGLOBIN GLYCOSYLATED A1C: CPT | Performed by: FAMILY MEDICINE

## 2024-09-26 PROCEDURE — 80050 GENERAL HEALTH PANEL: CPT | Performed by: FAMILY MEDICINE

## 2024-09-26 PROCEDURE — 80061 LIPID PANEL: CPT | Performed by: FAMILY MEDICINE

## 2024-09-26 RX ORDER — LISDEXAMFETAMINE DIMESYLATE 30 MG/1
1 TABLET, CHEWABLE ORAL
Qty: 14 TABLET | Refills: 0 | Status: SHIPPED | OUTPATIENT
Start: 2024-09-26 | End: 2024-10-10

## 2024-09-26 RX ORDER — ERGOCALCIFEROL 1.25 MG/1
50000 CAPSULE, LIQUID FILLED ORAL WEEKLY
Qty: 12 CAPSULE | Refills: 1 | Status: SHIPPED | OUTPATIENT
Start: 2024-11-01 | End: 2025-01-30

## 2024-09-26 RX ORDER — ESCITALOPRAM OXALATE 10 MG/1
10 TABLET ORAL DAILY
Qty: 30 TABLET | Refills: 3 | Status: SHIPPED | OUTPATIENT
Start: 2024-09-26

## 2024-09-26 RX ORDER — TRAZODONE HYDROCHLORIDE 50 MG/1
50 TABLET, FILM COATED ORAL NIGHTLY
Qty: 30 TABLET | Refills: 3 | Status: SHIPPED | OUTPATIENT
Start: 2024-09-26

## 2024-09-26 NOTE — PROGRESS NOTES
Subjective:   Rudy Olivares is a 23 year old male who presents for Physical (New medication- vyvanse shortage /Hx of depression would like to start depression med. /Weight concerns. Would like to get back on sleeping med from past. )     Patient presents for an annual physical.     He states that he is feeling well overall, but has been having a lot of issues with the Vyvanse shortage. The patient also states he tends to have seasonal depression which he would like to be proactive about this year. He has been working out more often, but is still having difficulty with sleep.   Depression: history of seasonal affective disorder, usually takes vitamin D in winter. Mood is stable.   Trying to lose weight.     History/Other:    Chief Complaint Reviewed and Verified  Nursing Notes Reviewed and   Verified  Tobacco Reviewed  Allergies Reviewed  Medications Reviewed    Problem List Reviewed  Medical History Reviewed  Surgical History   Reviewed  Family History Reviewed  Social History Reviewed         Tobacco:  He smoked tobacco in the past but quit greater than 12 months ago.  Social History     Tobacco Use   Smoking Status Former    Types: Cigars   Smokeless Tobacco Never        Current Outpatient Medications   Medication Sig Dispense Refill    Lisdexamfetamine Dimesylate (VYVANSE) 30 MG Oral Chew Tab Chew 1 tablet by mouth daily with breakfast for 14 days. 14 tablet 0    [START ON 11/1/2024] ergocalciferol 1.25 MG (56135 UT) Oral Cap Take 1 capsule (50,000 Units total) by mouth once a week. 12 capsule 1    escitalopram (LEXAPRO) 10 MG Oral Tab Take 1 tablet (10 mg total) by mouth daily. 30 tablet 3    traZODone 50 MG Oral Tab Take 1 tablet (50 mg total) by mouth nightly. 30 tablet 3         Review of Systems:  Review of Systems   Constitutional: Negative.    HENT: Negative.     Eyes: Negative.    Respiratory: Negative.     Cardiovascular: Negative.    Gastrointestinal: Negative.    Genitourinary: Negative.     Neurological: Negative.    Psychiatric/Behavioral: Negative.         Objective:   /70   Pulse 72   Temp 98 °F (36.7 °C)   Resp 18   Ht 6' (1.829 m)   Wt 263 lb 6.4 oz   SpO2 97%   BMI 35.72 kg/m²  Estimated body mass index is 35.72 kg/m² as calculated from the following:    Height as of this encounter: 6' (1.829 m).    Weight as of this encounter: 263 lb 6.4 oz.    Physical Exam  Constitutional:       Appearance: Normal appearance.   HENT:      Head: Normocephalic and atraumatic.      Mouth/Throat:      Mouth: Mucous membranes are moist.      Pharynx: Oropharynx is clear.   Eyes:      Pupils: Pupils are equal, round, and reactive to light.   Cardiovascular:      Rate and Rhythm: Normal rate and regular rhythm.      Pulses: Normal pulses.      Heart sounds: Normal heart sounds. No murmur heard.     No friction rub. No gallop.   Pulmonary:      Effort: Pulmonary effort is normal. No respiratory distress.      Breath sounds: Normal breath sounds. No wheezing or rales.   Musculoskeletal:      Cervical back: Normal range of motion and neck supple.   Skin:     General: Skin is warm and dry.      Findings: No bruising or lesion.   Neurological:      General: No focal deficit present.      Mental Status: He is alert and oriented to person, place, and time.   Psychiatric:         Mood and Affect: Mood normal.         Behavior: Behavior normal.         Thought Content: Thought content normal.         Judgment: Judgment normal.       ADD s/s are stable, hard time getting vyanse.   Major depression is stable-gets worse in the winter.   No Si or HI.       Assessment & Plan:   1. Pre-diabetes (Primary)  -     Ophthalmology Referral - In Network  2. Major depressive disorder, recurrent, moderate (HCC)  3. Attention deficit hyperactivity disorder (ADHD), unspecified ADHD type  -     Escitalopram Oxalate; Take 1 tablet (10 mg total) by mouth daily.  Dispense: 30 tablet; Refill: 3  4. History of nodular sclerosis  Hodgkin's disease  -     Lisdexamfetamine Dimesylate; Chew 1 tablet by mouth daily with breakfast for 14 days.  Dispense: 14 tablet; Refill: 0  -     Vitamin D (Ergocalciferol); Take 1 capsule (50,000 Units total) by mouth once a week.  Dispense: 12 capsule; Refill: 1  5. Adjustment insomnia  -     traZODone HCl; Take 1 tablet (50 mg total) by mouth nightly.  Dispense: 30 tablet; Refill: 3    Follow up: 3 months.     Darlene MARSHALL

## 2024-09-27 NOTE — PROGRESS NOTES
HPI:    Rudy Olivares is a 23 year old male who presents for Physical (New medication- vyvanse shortage /Hx of depression would like to start depression med. /Weight concerns. Would like to get back on sleeping med from past. )   Patient was evaluated by counselor with a history of adjustment depression with ADD symptoms, will discuss other options to adjustment depression, worse in the winter months.   Doing well with vyvanse, access to rx has been difficult, but helps with focus.     Patient returns for recheck of ADD treatment.   Has been using the stimulant medication on a regular basis.  Feels the medication has been helping improve focus.   Is doing better at work.   Others have noticed a difference.   Patient's appetite is good.   Denies tremors, tics or insomnia.   Patient has history of at least 6 months history of inattention and needs ADD evaluation:  Patient described decreased concentration, hyperactivity, distractibility, restlessness, constantly feeling like patient's been driven by motor  Symptoms started before the age of 11 years old.    Patient's inattention and hyperactivity and impulsivity is affecting home, school, work, with friends and relatives, and in most activities.  Symptoms affect day-to-day living.      Past History:   He  has a past medical history of Anxiety, Anxiety state, Cancer (HCC), Depression, and Muscle weakness.   He  has a past surgical history that includes biopsy.   His family history includes Anxiety in his maternal grandfather and mother; Depression in his maternal grandfather, maternal grandmother, and mother; Diabetes in his father; Other in his father; Substance Abuse in his maternal grandfather and paternal grandfather.   He  reports that he has quit smoking. His smoking use included cigars. He has never used smokeless tobacco. He reports current alcohol use. He reports that he does not use drugs.     He is not on any long-term medications.   He has No Known  Allergies.     No current outpatient medications on file prior to visit.     No current facility-administered medications on file prior to visit.         REVIEW OF SYSTEMS:   Patient denies shortness of breath, denies chest pain and denies any recent fevers or chills.    Patient reports no urinary complaints and denies headaches or visual disturbances.   Patient denies any abdominal pain at this time. Patient has no new skin lesions.  Patient reports no acute back pain and reports no dizziness or headaches.   Patient reports no visual disturbances and reports hearing has been about the same.   Patient reports no recent injury or trauma.               EXAM:    /70   Pulse 72   Temp 98 °F (36.7 °C)   Resp 18   Ht 6' (1.829 m)   Wt 263 lb 6.4 oz (119.5 kg)   SpO2 97%   BMI 35.72 kg/m²  Estimated body mass index is 35.72 kg/m² as calculated from the following:    Height as of this encounter: 6' (1.829 m).    Weight as of this encounter: 263 lb 6.4 oz (119.5 kg).    General Appearance:  Alert, cooperative, no distress, appears stated age   Head:  Normocephalic, without obvious abnormality, atraumatic   Eyes:  conjunctiva/cornea is not erythematous.        Nose: No nasal drainage.    Throat: No erythema    Neck: Supple, symmetrical, trachea midline, and normal ROM  thyroid: no obvious nodules   Back:   Symmetric, no curvature, ROM normal, no CVA tenderness   Lungs:   Clear to auscultation bilaterally, respirations unlabored   Chest Wall:  No tenderness or deformity   Heart:  Regular rate and rhythm, S1, S2 normal, no murmur,   Abdomen:   Soft, non-tender, bowel sounds active. No hernia.    Genitalia:     Rectal:     Extremities: Extremities normal, atraumatic, no cyanosis or edema   Pulses: 2+ and symmetric   Skin: Skin color, texture, turgor normal, no new rashes    Lymph nodes: No obvious cervical adenopathy.    Neurologic and psych: Normal speech, Alert and oriented x 3.   Normal mood, normal insight and  judgment.    Stable ADD moderate symptoms.     Adjustment anxiety and depression was discussed, seasonal depression +            ASSESSMENT AND PLAN:   1. Annual physical exam  -wellness visit was done    2. Pre-diabetes  -weight loss and diet, depression treatment    3. Major depressive disorder, recurrent, moderate (HCC)  -lexapro during the winter months, mood is good at this time.     4. Attention deficit hyperactivity disorder (ADHD), unspecified ADHD type  -as below:  - escitalopram (LEXAPRO) 10 MG Oral Tab; Take 1 tablet (10 mg total) by mouth daily.  Dispense: 30 tablet; Refill: 3    5. History of nodular sclerosis Hodgkin's disease  -seeing Oncology through [facility], vyvanse in different form, hopefully will have better access.   - Lisdexamfetamine Dimesylate (VYVANSE) 30 MG Oral Chew Tab; Chew 1 tablet by mouth daily with breakfast for 14 days.  Dispense: 14 tablet; Refill: 0  - ergocalciferol 1.25 MG (10269 UT) Oral Cap; Take 1 capsule (50,000 Units total) by mouth once a week.  Dispense: 12 capsule; Refill: 1    6. Adjustment insomnia  -refill on rx as below:  - traZODone 50 MG Oral Tab; Take 1 tablet (50 mg total) by mouth nightly.  Dispense: 30 tablet; Refill: 3     Follow up in 3-6 months, possible concerta vs. ER adderall if vyvanse chewable is not working out.       Note to patient: The 21st Century Cures Act makes medical notes like these available to patients in the interest of transparency. However, this is a medical document intended as peer to peer communication. It is written in medical language and may contain abbreviations or verbiage that are unfamiliar. It may appear blunt or direct. Medical documents are intended to carry relevant information, facts as evident, and the clinical opinion of the practitioner who signs the document.

## 2024-10-11 ENCOUNTER — PATIENT MESSAGE (OUTPATIENT)
Dept: FAMILY MEDICINE CLINIC | Facility: CLINIC | Age: 24
End: 2024-10-11

## 2024-10-11 DIAGNOSIS — F90.9 ATTENTION DEFICIT HYPERACTIVITY DISORDER (ADHD), UNSPECIFIED ADHD TYPE: ICD-10-CM

## 2024-10-11 NOTE — TELEPHONE ENCOUNTER
Patient requesting a refill of vyvanse   LF 9/26/24  LOV 9/26    Please approve or deny pended RX  
Yes

## 2024-10-14 RX ORDER — LISDEXAMFETAMINE DIMESYLATE 30 MG/1
1 TABLET, CHEWABLE ORAL
Qty: 30 TABLET | Refills: 0 | Status: SHIPPED | OUTPATIENT
Start: 2024-10-14

## 2025-03-21 ENCOUNTER — OFFICE VISIT (OUTPATIENT)
Dept: FAMILY MEDICINE CLINIC | Facility: CLINIC | Age: 25
End: 2025-03-21
Payer: COMMERCIAL

## 2025-03-21 VITALS
SYSTOLIC BLOOD PRESSURE: 122 MMHG | HEIGHT: 72 IN | RESPIRATION RATE: 18 BRPM | WEIGHT: 260 LBS | HEART RATE: 67 BPM | DIASTOLIC BLOOD PRESSURE: 64 MMHG | BODY MASS INDEX: 35.21 KG/M2 | OXYGEN SATURATION: 97 %

## 2025-03-21 DIAGNOSIS — Z30.09 FAMILY PLANNING COUNSELING: ICD-10-CM

## 2025-03-21 DIAGNOSIS — F90.9 ATTENTION DEFICIT HYPERACTIVITY DISORDER (ADHD), UNSPECIFIED ADHD TYPE: ICD-10-CM

## 2025-03-21 DIAGNOSIS — G89.29 CHRONIC RIGHT HIP PAIN: ICD-10-CM

## 2025-03-21 DIAGNOSIS — R73.03 PRE-DIABETES: Primary | ICD-10-CM

## 2025-03-21 DIAGNOSIS — F51.02 ADJUSTMENT INSOMNIA: ICD-10-CM

## 2025-03-21 DIAGNOSIS — M25.551 CHRONIC RIGHT HIP PAIN: ICD-10-CM

## 2025-03-21 DIAGNOSIS — F33.1 MAJOR DEPRESSIVE DISORDER, RECURRENT, MODERATE (HCC): ICD-10-CM

## 2025-03-21 PROCEDURE — 3008F BODY MASS INDEX DOCD: CPT | Performed by: FAMILY MEDICINE

## 2025-03-21 PROCEDURE — 99214 OFFICE O/P EST MOD 30 MIN: CPT | Performed by: FAMILY MEDICINE

## 2025-03-21 PROCEDURE — 3078F DIAST BP <80 MM HG: CPT | Performed by: FAMILY MEDICINE

## 2025-03-21 PROCEDURE — 3074F SYST BP LT 130 MM HG: CPT | Performed by: FAMILY MEDICINE

## 2025-03-21 RX ORDER — LISDEXAMFETAMINE DIMESYLATE 30 MG/1
30 TABLET, CHEWABLE ORAL DAILY
Qty: 30 TABLET | Refills: 0 | Status: SHIPPED | OUTPATIENT
Start: 2025-04-21 | End: 2025-05-21

## 2025-03-21 RX ORDER — LISDEXAMFETAMINE DIMESYLATE 30 MG/1
30 TABLET, CHEWABLE ORAL DAILY
Qty: 30 TABLET | Refills: 0 | Status: SHIPPED | OUTPATIENT
Start: 2025-05-22 | End: 2025-06-21

## 2025-03-21 RX ORDER — TRAZODONE HYDROCHLORIDE 50 MG/1
50 TABLET ORAL NIGHTLY
Qty: 30 TABLET | Refills: 5 | Status: SHIPPED | OUTPATIENT
Start: 2025-03-21

## 2025-03-21 RX ORDER — LISDEXAMFETAMINE DIMESYLATE 30 MG/1
30 TABLET, CHEWABLE ORAL DAILY
Qty: 30 TABLET | Refills: 0 | Status: SHIPPED | OUTPATIENT
Start: 2025-03-21 | End: 2025-04-20

## 2025-03-21 NOTE — PROGRESS NOTES
The following individual(s) verbally consented to be recorded using ambient AI listening technology and understand that they can each withdraw their consent to this listening technology at any point by asking the clinician to turn off or pause the recording:    Patient name: Rudy REJI Olivares  Additional names:

## 2025-03-24 NOTE — PROGRESS NOTES
Subjective:   Rudy Olivares is a 24 year old male who presents for Hip Pain (Right side, pain radiates down into leg. Pt states he injured hip while squatting with weights. Pain started 2 weeks ago)         History/Other:   History of Present Illness  Rudy Olivares is a 24 year old male who presents with right hip pain.    He has experienced right hip pain for as long as he can remember, which worsens with movements such as squatting. About three weeks ago, he noticed increased pain and an inability to squat as deeply. He describes the hip as always being tight and suspects a hip impingement. There is no history of recent or past injuries to the hip.    He has been off Vyvanse due to prescription refill issues, previously taking 30 mg chewable tablets. While on Vyvanse, he experienced weight loss and improved control over impulsivity. He is considering restarting the medication. He also takes Lexapro seasonally, typically starting in the fall when his mood tends to decline. No significant mood issues currently, but his mood typically declines in the winter.    He has been losing weight, currently weighing 253.8 pounds, and attributes some of this to his parents' use of medications like Ozempic, although he is not using these medications himself. He is focusing on a high protein and low carbohydrate intake.    He is currently pursuing studies in  studies and has interests in finance and [employer]. He is involved in weightlifting and has friends at the gym. He wants to spend more time outdoors. Both parents are on medications like Ozempic for weight management.   Chief Complaint Reviewed and Verified  Nursing Notes Reviewed and   Verified  Tobacco Reviewed  Allergies Reviewed  Medications Reviewed    Problem List Reviewed  Medical History Reviewed  Surgical History   Reviewed  Family History Reviewed  Social History Reviewed       Tobacco:  He smoked tobacco in the past but quit greater than 12  months ago.  Social History     Tobacco Use   Smoking Status Former    Types: Cigars   Smokeless Tobacco Never        Current Outpatient Medications   Medication Sig Dispense Refill    traZODone 50 MG Oral Tab Take 1 tablet (50 mg total) by mouth nightly. 30 tablet 5    Lisdexamfetamine Dimesylate (VYVANSE) 30 MG Oral Chew Tab Chew 30 mg by mouth daily. 30 tablet 0    [START ON 4/21/2025] Lisdexamfetamine Dimesylate (VYVANSE) 30 MG Oral Chew Tab Chew 30 mg by mouth daily. 30 tablet 0    [START ON 5/22/2025] Lisdexamfetamine Dimesylate (VYVANSE) 30 MG Oral Chew Tab Chew 30 mg by mouth daily. 30 tablet 0         Review of Systems:  Review of Systems  Patient denies shortness of breath, denies chest pain and denies any recent fevers or chills.    Patient reports no urinary complaints and denies headaches or visual disturbances.   Patient denies any abdominal pain at this time. Patient has no new skin lesions.  Patient reports no acute back pain and reports no dizziness or headaches.   Patient reports no visual disturbances and reports hearing has been about the same.   Patient reports no recent injury or trauma.       Objective:   /64   Pulse 67   Resp 18   Ht 6' (1.829 m)   Wt 260 lb (117.9 kg)   SpO2 97%   BMI 35.26 kg/m²  Estimated body mass index is 35.26 kg/m² as calculated from the following:    Height as of this encounter: 6' (1.829 m).    Weight as of this encounter: 260 lb (117.9 kg).  Physical Exam  Constitutional:       Appearance: He is well-developed.   Cardiovascular:      Rate and Rhythm: Normal rate and regular rhythm.      Heart sounds: Normal heart sounds.   Pulmonary:      Effort: Pulmonary effort is normal.      Breath sounds: Normal breath sounds.   Abdominal:      General: Bowel sounds are normal.      Palpations: Abdomen is soft.   Musculoskeletal:      Right hip: Tenderness present. Decreased range of motion. Decreased strength.   Skin:     General: Skin is warm and dry.    Neurological:      Mental Status: He is alert and oriented to person, place, and time.      Deep Tendon Reflexes: Reflexes are normal and symmetric.       Results          Assessment & Plan:   1. Pre-diabetes  -diet control and weight loss, labs every 6 months.   - Comp Metabolic Panel (14); Future  - Hemoglobin A1C; Future  - Lipid Panel; Future  - Microalb/Creat Ratio, Random Urine; Future    2. Adjustment insomnia  -stable, CPM  - traZODone 50 MG Oral Tab; Take 1 tablet (50 mg total) by mouth nightly.  Dispense: 30 tablet; Refill: 5    3. Chronic right hip pain  -will check right hip, will refer to PT  - XR HIP + PELVIS MIN 4 VIEWS RIGHT (CPT=73913); Future  - Physical Therapy Referral - [facility]    4. Attention deficit hyperactivity disorder (ADHD), unspecified ADHD type  -stable, restart as below:  - Lisdexamfetamine Dimesylate (VYVANSE) 30 MG Oral Chew Tab; Chew 30 mg by mouth daily.  Dispense: 30 tablet; Refill: 0  - Lisdexamfetamine Dimesylate (VYVANSE) 30 MG Oral Chew Tab; Chew 30 mg by mouth daily.  Dispense: 30 tablet; Refill: 0  - Lisdexamfetamine Dimesylate (VYVANSE) 30 MG Oral Chew Tab; Chew 30 mg by mouth daily.  Dispense: 30 tablet; Refill: 0    5. Major depressive disorder, recurrent, moderate (HCC)  -stable, off srri, doing well for now.     6. 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 No history of injury. Pain worsened three weeks ago.  - Order right hip x-ray to evaluate bone and socket structure  - Refer to physical therapy for hip pain management  - Consider MRI if x-ray findings are concerning  - Consider referral to orthopedist if necessary    Attention Deficit Hyperactivity Disorder (ADHD)  Off Vyvanse due to refill issues. Previously on 30 mg dose. Reports weight loss and improved impulsivity  control while on medication. Decision to restart medication based on pharmacy availability and preference.  - Prescribe Vyvanse 30 mg chewable tablets  - Ensure pharmacy can provide a consistent supply for six months    Major Depressive Disorder, Recurrent, Moderate  Reports well-managed mood during winter, typically a challenging period. Decision to hold off on restarting Escitalopram until fall when mood typically declines.  - Hold Escitalopram until fall  - Reassess need for medication in fall    Fertility Concerns  Inquires about semen analysis due to nodular sclerosis Hodgkin's disease and ongoing billing for sperm storage. Currently in remission and interested in assessing fertility status.  - Order semen analysis  - Check with lab for appropriate location for semen analysis      No follow-ups on file.      Jony Beltran MD, 3/23/2025, 8:27 PM

## 2025-03-26 ENCOUNTER — HOSPITAL ENCOUNTER (OUTPATIENT)
Dept: GENERAL RADIOLOGY | Age: 25
Discharge: HOME | End: 2025-03-26
Attending: FAMILY MEDICINE
Payer: COMMERCIAL

## 2025-03-26 DIAGNOSIS — G89.29 CHRONIC RIGHT HIP PAIN: ICD-10-CM

## 2025-03-26 DIAGNOSIS — M25.551 CHRONIC RIGHT HIP PAIN: ICD-10-CM

## 2025-03-26 PROCEDURE — 73502 X-RAY EXAM HIP UNI 2-3 VIEWS: CPT | Performed by: FAMILY MEDICINE

## 2025-04-26 ENCOUNTER — LAB ENCOUNTER (OUTPATIENT)
Dept: LAB | Facility: HOSPITAL | Age: 25
End: 2025-04-26
Attending: FAMILY MEDICINE
Payer: COMMERCIAL

## 2025-04-26 DIAGNOSIS — Z30.09 FAMILY PLANNING COUNSELING: ICD-10-CM

## 2025-04-26 LAB
PH SMN: 8 [PH] (ref 7.2–8.3)
SPECIMEN VOL SMN: 4 ML (ref 1.5–6)
SPERM # SMN: 162.1 MILL/ML (ref 15–150)
SPERM IMMOTILE NFR SMN: 20 %
SPERM IMMOTILE NFR SMN: 7 %
SPERM PROG NFR SMN: 73 % (ref 32–100)
VISC SMN QL: NORMAL

## 2025-04-26 PROCEDURE — 89320 SEMEN ANAL VOL/COUNT/MOT: CPT | Performed by: FAMILY MEDICINE

## 2025-04-29 ENCOUNTER — TELEPHONE (OUTPATIENT)
Dept: PHYSICAL THERAPY | Facility: HOSPITAL | Age: 25
End: 2025-04-29

## 2025-05-02 ENCOUNTER — OFFICE VISIT (OUTPATIENT)
Facility: LOCATION | Age: 25
End: 2025-05-02
Attending: FAMILY MEDICINE
Payer: COMMERCIAL

## 2025-05-02 DIAGNOSIS — M25.551 CHRONIC RIGHT HIP PAIN: Primary | ICD-10-CM

## 2025-05-02 DIAGNOSIS — G89.29 CHRONIC RIGHT HIP PAIN: Primary | ICD-10-CM

## 2025-05-02 PROCEDURE — 97110 THERAPEUTIC EXERCISES: CPT

## 2025-05-02 PROCEDURE — 97162 PT EVAL MOD COMPLEX 30 MIN: CPT

## 2025-05-05 ENCOUNTER — OFFICE VISIT (OUTPATIENT)
Dept: FAMILY MEDICINE CLINIC | Facility: CLINIC | Age: 25
End: 2025-05-05
Payer: COMMERCIAL

## 2025-05-05 VITALS
BODY MASS INDEX: 33.59 KG/M2 | DIASTOLIC BLOOD PRESSURE: 60 MMHG | WEIGHT: 248 LBS | OXYGEN SATURATION: 99 % | HEART RATE: 60 BPM | RESPIRATION RATE: 22 BRPM | SYSTOLIC BLOOD PRESSURE: 100 MMHG | HEIGHT: 72 IN

## 2025-05-05 DIAGNOSIS — M62.838 NECK MUSCLE SPASM: ICD-10-CM

## 2025-05-05 DIAGNOSIS — F90.9 ATTENTION DEFICIT HYPERACTIVITY DISORDER (ADHD), UNSPECIFIED ADHD TYPE: ICD-10-CM

## 2025-05-05 DIAGNOSIS — Z85.72 HISTORY OF LYMPHOMA: ICD-10-CM

## 2025-05-05 DIAGNOSIS — Z79.899 HIGH RISK MEDICATION USE: ICD-10-CM

## 2025-05-05 DIAGNOSIS — Z30.09 FAMILY PLANNING COUNSELING: Primary | ICD-10-CM

## 2025-05-05 PROCEDURE — 3074F SYST BP LT 130 MM HG: CPT | Performed by: FAMILY MEDICINE

## 2025-05-05 PROCEDURE — 3078F DIAST BP <80 MM HG: CPT | Performed by: FAMILY MEDICINE

## 2025-05-05 PROCEDURE — 99214 OFFICE O/P EST MOD 30 MIN: CPT | Performed by: FAMILY MEDICINE

## 2025-05-05 PROCEDURE — 3008F BODY MASS INDEX DOCD: CPT | Performed by: FAMILY MEDICINE

## 2025-05-05 RX ORDER — CYCLOBENZAPRINE HCL 10 MG
10 TABLET ORAL NIGHTLY PRN
Qty: 15 TABLET | Refills: 1 | Status: SHIPPED | OUTPATIENT
Start: 2025-05-05 | End: 2025-05-20

## 2025-05-06 NOTE — PROGRESS NOTES
HPI:    Rudy Olivares is a 24 year old male who presents for Lab Results (Semen Analysis) and Neck Pain (Going on since Thursday, restricting ROM )     History of Present Illness  Rudy Olivares is a 24 year old male who presents with concerns about fertility and neck pain.    He is concerned about his fertility following chemotherapy treatment completed on April 13, 2022. A semen analysis conducted last week showed 18% viable sperm. He is not currently in a relationship but is contemplating future family planning.    He experiences significant neck pain, particularly when turning his head to the right or left. The pain is localized to the upper neck and sometimes radiates downwards, accompanied by a 'lock' sensation. The pain is exacerbated by heavy lifting, which he engages in regularly, using a neck harness with a resistance band for exercises. The pain occurs every three weeks and impacts his ability to work out. He has been using Aleve for pain relief.    He is currently taking Vyvanse, which he recently refilled, and reports no issues with its use. 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   Patient is presenting with 6 months history of inattention and needs ADD evaluation:    Patient describes decreased concentration, hyperactivity, distractibility, restlessness, constantly feeling like patient's been driven by motor  Symptoms started before the age of 11 years old.    Patient's inattention and hyperactivity and impulsivity is affecting home, school, work, with friends and relatives, and in most activities.  Symptoms affect day-to-day living.     Past History:   He  has a past medical history of Anxiety, Anxiety state, Cancer (HCC), Depression, and Muscle weakness.   He  has a past surgical history that includes biopsy.   His family history  includes Anxiety in his maternal grandfather and mother; Depression in his maternal grandfather, maternal grandmother, and mother; Diabetes in his father; Other in his father; Substance Abuse in his maternal grandfather and paternal grandfather.   He  reports that he has quit smoking. His smoking use included cigars. He has never used smokeless tobacco. He reports current alcohol use. He reports that he does not use drugs.     He is not on any long-term medications.   He has no known allergies.   Medications Ordered Prior to this Encounter[1]      REVIEW OF SYSTEMS:   Patient denies shortness of breath, denies chest pain and denies any recent fevers or chills.    Patient reports no urinary complaints and denies headaches or visual disturbances.   Patient denies any abdominal pain at this time. Patient has no new skin lesions.  Patient reports no acute back pain and reports no dizziness or headaches.   Patient reports no visual disturbances and reports hearing has been about the same.   Patient reports no recent injury or trauma.               EXAM:    /60   Pulse 60   Resp 22   Ht 6' (1.829 m)   Wt 248 lb (112.5 kg)   SpO2 99%   BMI 33.63 kg/m²  Estimated body mass index is 33.63 kg/m² as calculated from the following:    Height as of this encounter: 6' (1.829 m).    Weight as of this encounter: 248 lb (112.5 kg).    General Appearance:  Alert, cooperative, no distress, appears stated age   Head:  Normocephalic, without obvious abnormality, atraumatic   Eyes:  conjunctiva/cornea is not erythematous.        Nose: No nasal drainage.    Throat: No erythema    Neck: Supple, symmetrical, trachea midline, and normal ROM  thyroid: no obvious nodules   Back:   Symmetric, no curvature, ROM normal, no CVA tenderness   Lungs:   Clear to auscultation bilaterally, respirations unlabored   Chest Wall:  No tenderness or deformity   Heart:  Regular rate and rhythm, S1, S2 normal, no murmur,   Abdomen:   Soft,  non-tender, bowel sounds active. No hernia.    Genitalia:     Rectal:     Extremities: Extremities normal, atraumatic, no cyanosis or edema   Pulses: 2+ and symmetric   Skin: Skin color, texture, turgor normal, no new rashes    Lymph nodes: No obvious cervical adenopathy.    Neurologic and psych: Normal speech, Alert and oriented x 3.   Normal mood, normal insight and judgment.    Right posterior neck spasm.         Assessment & Plan  Neck pain  Intermittent neck pain with difficulty turning right and left, localized to the upper neck and radiating downwards. Occurs every three weeks, likely related to heavy lifting. Exacerbated by certain movements and alleviated by rest. No muscle soreness or tightness in other areas. Currently using a neck harness and resistance band for exercises.  - Prescribe muscle relaxant for evening use as needed for neck pain.  - Advise against exercising with the muscle relaxant due to potential grogginess.  - Recommend heat therapy for neck tension relief.  - Continue Aleve during the day for pain management.  - Consider physical therapy if pain persists or worsens.    Chemotherapy  Completed chemotherapy on April 13, 2022. Current semen analysis shows 18% viable sperm, above average, with good morphology and motility, though some structural defects are present. Fertility may improve over time post-chemotherapy. Advised to attempt natural conception first, with a good likelihood of success given current semen analysis results.  - Consider repeat semen analysis if fertility issues persist.  - Recommend genetic testing for both partners before attempting conception due to past chemotherapy exposure.  - Refer to a fertility specialist if conception is not achieved after 3-6 months of trying.           Jony Beltran MD, 5/6/2025, 2:09 PM     Note to patient: The 21st Century Cures Act makes medical notes like these available to patients in the interest of transparency. However, this is a  medical document intended as peer to peer communication. It is written in medical language and may contain abbreviations or verbiage that are unfamiliar. It may appear blunt or direct. Medical documents are intended to carry relevant information, facts as evident, and the clinical opinion of the practitioner who signs the document.        [1]   Current Outpatient Medications on File Prior to Visit   Medication Sig    traZODone 50 MG Oral Tab Take 1 tablet (50 mg total) by mouth nightly.    Lisdexamfetamine Dimesylate (VYVANSE) 30 MG Oral Chew Tab Chew 30 mg by mouth daily.    [START ON 5/22/2025] Lisdexamfetamine Dimesylate (VYVANSE) 30 MG Oral Chew Tab Chew 30 mg by mouth daily.     No current facility-administered medications on file prior to visit.

## 2025-05-08 ENCOUNTER — OFFICE VISIT (OUTPATIENT)
Facility: LOCATION | Age: 25
End: 2025-05-08
Attending: FAMILY MEDICINE
Payer: COMMERCIAL

## 2025-05-08 PROCEDURE — 97110 THERAPEUTIC EXERCISES: CPT

## 2025-05-08 PROCEDURE — 97140 MANUAL THERAPY 1/> REGIONS: CPT

## 2025-05-08 NOTE — PROGRESS NOTES
Patient: Rudy Olivares (24 year old, male) Referring Provider:  Insurance:   Diagnosis: Chronic right hip pain (M25.551,G89.29) Jony Beltran  [employer]   Date of Surgery: NA Next MD visit:  N/A   Precautions:  None No data recorded Referral Information:    Date of Evaluation: Req: 1, Auth: 1, Exp: 3/21/2026    05/02/25 POC Auth Visits:          Today's Date   5/8/2025    Subjective  The patient reports he hasn't been able to work on exercises at home at all since he had finals this past week       Pain: 0/10     Objective  See flow sheet    Flexibility: Moderate limitations in bilateral hamstrings and hip flexors       Assessment  The patient tolerated treatment well, some intermittent reports of anterior right hip pain with exercises    Goals (to be met in 10 visits)   Patient to demonstrate independence and compliance with a comprehensive home exercise program  Patient to improve LEFS score to be better than 85%  Patient to demonstrate improved right hip flexion to be at least 120 degrees without complaints of pain  Patient to perform deep squat without complaints of groin/hip pain  Patient to report no complaints of right hip pain at rest or with activity         Plan  Continue PT, progressing hip strengthening and mobility as tolerated    Treatment Last 4 Visits  Treatment Day: 2       5/2/2025 5/8/2025   LE Treatment   Therapeutic Exercise Supine active HS stretch x 10 with 5 sec holds  Supine figure 4 stretch 2 x 30 sec  1/2 tall kneeling hip flexor stretch x 30 sec  Reviewed self mobilization with band/strap at home Upright bike L6 x 8 min  Supine figure 4 stretch 2 x 30 sec  H/L QL/hip IR stretch 2 x 30 sec  1/2 tall kneeling hip flexor stretch 2 x 30   Quadruped prayer stretch 3 x 30 sec  S/L quad stretch 2 x 30 sec  Shuttle Bilateral 175# 2 x 15     Manual Therapy MWM lateral and posterior glide of right femur with passive hip flexion 3 x 10 MWM with lateral glide of hip with belt with passive hip  flexion, ER, IR  Long axis hip distraction grade 3   Therapeutic Exercise Minutes 10 32   Manual Therapy Minutes 4 10   Evaluation Minutes 30    Total Time Of Timed Procedures 14 42   Total Time Of Service-Based Procedures 30 0   Total Treatment Time 44 42   HEP Supine active HS stretch   Supine figure 4 stretch   1/2 tall kneeling hip flexor stretch          HEP  Supine active HS stretch   Supine figure 4 stretch   1/2 tall kneeling hip flexor stretch     Charges     TherEx x 2; MT x 1

## 2025-05-12 ENCOUNTER — OFFICE VISIT (OUTPATIENT)
Facility: LOCATION | Age: 25
End: 2025-05-12
Attending: FAMILY MEDICINE
Payer: COMMERCIAL

## 2025-05-12 PROCEDURE — 97140 MANUAL THERAPY 1/> REGIONS: CPT

## 2025-05-12 PROCEDURE — 97110 THERAPEUTIC EXERCISES: CPT

## 2025-05-12 NOTE — PROGRESS NOTES
Patient: uRdy Olivares (24 year old, male) Referring Provider:  Insurance:   Diagnosis: Chronic right hip pain (M25.551,G89.29) Jony Beltran  [employer] [place]   Date of Surgery: NA Next MD visit:  N/A   Precautions:  None No data recorded Referral Information:    Date of Evaluation: Req: 0, Auth: 0, Exp:     05/02/25 POC Auth Visits:          Today's Date   5/12/2025    Subjective  The patient reports some increased pain in the hip with walking       Pain: 3/10     Objective  See flow sheet           Assessment  The patient tolerated addition of hip strengthening exercises well without significantly increased complaints of pain    Goals (to be met in 10 visits)   Patient to demonstrate independence and compliance with a comprehensive home exercise program  Patient to improve LEFS score to be better than 85%  Patient to demonstrate improved right hip flexion to be at least 120 degrees without complaints of pain  Patient to perform deep squat without complaints of groin/hip pain  Patient to report no complaints of right hip pain at rest or with activity           Plan  Continue PT, progressing hip strengthening and mobility as tolerated    Treatment Last 4 Visits  Treatment Day: 3       5/2/2025 5/8/2025 5/12/2025   LE Treatment   Therapeutic Exercise Supine active HS stretch x 10 with 5 sec holds  Supine figure 4 stretch 2 x 30 sec  1/2 tall kneeling hip flexor stretch x 30 sec  Reviewed self mobilization with band/strap at home Upright bike L6 x 8 min  Supine figure 4 stretch 2 x 30 sec  H/L QL/hip IR stretch 2 x 30 sec  1/2 tall kneeling hip flexor stretch 2 x 30   Quadruped prayer stretch 3 x 30 sec  S/L quad stretch 2 x 30 sec  Shuttle Bilateral 175# 2 x 15   Upright bike L6 x 6 min  Supine figure 4 stretch 3 x 30 sec  H/L QL/lateral hip stretch 2 x 30 sec  Bridging with blue band 2 x 15  S/L clams blue band 2 x 15  Sidestepping with blue band to fatigue     Manual Therapy MWM lateral and posterior glide of right  femur with passive hip flexion 3 x 10 MWM with lateral glide of hip with belt with passive hip flexion, ER, IR  Long axis hip distraction grade 3 Long axis hip distraction grade 3  Posterior glide of right hip with passive hip flexion  MWM lateral glide of right hip with belt with passive hip flexion and hip IR   Therapeutic Exercise Minutes 10 32 30   Manual Therapy Minutes 4 10 10   Evaluation Minutes 30     Total Time Of Timed Procedures 14 42 40   Total Time Of Service-Based Procedures 30 0 0   Total Treatment Time 44 42 40   HEP Supine active HS stretch   Supine figure 4 stretch   1/2 tall kneeling hip flexor stretch   Supine figure 4 stretch 3 x 30 sec  H/L QL/lateral hip stretch 2 x 30 sec  Bridging with blue band 2 x 15  S/L clams blue band 2 x 15  Sidestepping with blue band to fatigue        HEP  Supine figure 4 stretch 3 x 30 sec  H/L QL/lateral hip stretch 2 x 30 sec  Bridging with blue band 2 x 15  S/L clams blue band 2 x 15  Sidestepping with blue band to fatigue    Charges     TherEx x 2; MT x 1

## 2025-05-14 ENCOUNTER — OFFICE VISIT (OUTPATIENT)
Facility: LOCATION | Age: 25
End: 2025-05-14
Attending: FAMILY MEDICINE
Payer: COMMERCIAL

## 2025-05-14 PROCEDURE — 97110 THERAPEUTIC EXERCISES: CPT

## 2025-05-14 PROCEDURE — 97140 MANUAL THERAPY 1/> REGIONS: CPT

## 2025-05-14 NOTE — PROGRESS NOTES
Patient: Rudy Olivares (24 year old, male) Referring Provider:  Insurance:   Diagnosis: Chronic right hip pain (M25.551,G89.29) Jony Beltran  [employer] [place]   Date of Surgery: NA Next MD visit:  N/A   Precautions:  None No data recorded Referral Information:    Date of Evaluation: Req: 0, Auth: 0, Exp:     05/02/25 POC Auth Visits:  8       Today's Date   5/14/2025    Subjective  The patient reports his hip felt pretty good after last session, but he's had some muscle soreness in his hips after exercise       Pain: 3/10     Objective  Special tests; Hip scouring positive, FADIR positive           Assessment  The patient tolerated treatment well with minimal complaints of hip pain after treatment. He was able to demonstrate squatting without complaints of hip pain with a wedge under his heels to compensate for decreased ankle mobility    Goals (to be met in 10 visits)   Patient to demonstrate independence and compliance with a comprehensive home exercise program  Patient to improve LEFS score to be better than 85%  Patient to demonstrate improved right hip flexion to be at least 120 degrees without complaints of pain  Patient to perform deep squat without complaints of groin/hip pain  Patient to report no complaints of right hip pain at rest or with activity             Plan  Continue PT, progressing hip strengthening and mobility as tolerated    Treatment Last 4 Visits  Treatment Day: 4       5/2/2025 5/8/2025 5/12/2025 5/14/2025   LE Treatment   Therapeutic Exercise Supine active HS stretch x 10 with 5 sec holds  Supine figure 4 stretch 2 x 30 sec  1/2 tall kneeling hip flexor stretch x 30 sec  Reviewed self mobilization with band/strap at home Upright bike L6 x 8 min  Supine figure 4 stretch 2 x 30 sec  H/L QL/hip IR stretch 2 x 30 sec  1/2 tall kneeling hip flexor stretch 2 x 30   Quadruped prayer stretch 3 x 30 sec  S/L quad stretch 2 x 30 sec  Shuttle Bilateral 175# 2 x 15   Upright bike L6 x 6 min  Supine  figure 4 stretch 3 x 30 sec  H/L QL/lateral hip stretch 2 x 30 sec  Bridging with blue band 2 x 15  S/L clams blue band 2 x 15  Sidestepping with blue band to fatigue   Nustep L5 x 6 min  H/L QL/lateral hip stretch  Reviewed squatting form/position  Standing gastroc stretch  Wall gastroc stretch  Sidestepping with blue band 2 x to fatigue  S/L clams blue band 2 x 15  Bridging with blue band 2 x 15   Manual Therapy MWM lateral and posterior glide of right femur with passive hip flexion 3 x 10 MWM with lateral glide of hip with belt with passive hip flexion, ER, IR  Long axis hip distraction grade 3 Long axis hip distraction grade 3  Posterior glide of right hip with passive hip flexion  MWM lateral glide of right hip with belt with passive hip flexion and hip IR MWM lateral glide of right hip with belt with passive hip flexion and IR  Long axis hip distraction with passive hip flexion x 10  Long axis hip distraction grade 3   Therapeutic Exercise Minutes 10 32 30 30   Manual Therapy Minutes 4 10 10 10   Evaluation Minutes 30      Total Time Of Timed Procedures 14 42 40 40   Total Time Of Service-Based Procedures 30 0 0 0   Total Treatment Time 44 42 40 40   HEP Supine active HS stretch   Supine figure 4 stretch   1/2 tall kneeling hip flexor stretch   Supine figure 4 stretch 3 x 30 sec  H/L QL/lateral hip stretch 2 x 30 sec  Bridging with blue band 2 x 15  S/L clams blue band 2 x 15  Sidestepping with blue band to fatigue         HEP  Supine figure 4 stretch 3 x 30 sec  H/L QL/lateral hip stretch 2 x 30 sec  Bridging with blue band 2 x 15  S/L clams blue band 2 x 15  Sidestepping with blue band to fatigue    Charges     TherEx x 2; MT x 1

## 2025-05-19 ENCOUNTER — OFFICE VISIT (OUTPATIENT)
Facility: LOCATION | Age: 25
End: 2025-05-19
Attending: FAMILY MEDICINE
Payer: COMMERCIAL

## 2025-05-19 PROCEDURE — 97110 THERAPEUTIC EXERCISES: CPT

## 2025-05-19 PROCEDURE — 97140 MANUAL THERAPY 1/> REGIONS: CPT

## 2025-05-19 NOTE — PROGRESS NOTES
Patient: Rudy Olivares (24 year old, male) Referring Provider:  Insurance:   Diagnosis: Chronic right hip pain (M25.551,G89.29) Jony Beltran  [place]   Date of Surgery: NA Next MD visit:  N/A   Precautions:  None No data recorded Referral Information:    Date of Evaluation: Req: 0, Auth: 0, Exp:     05/02/25 POC Auth Visits:  8       Today's Date   5/19/2025    Subjective  The patient reports he's been feeling a little bit better overall       Pain: 2/10     Objective  Special tests; Hip scouring positive, FADIR positive         Assessment  The patient tolerated treatment well with no significantly increased complaints of hip pain with exercises    Goals (to be met in 10 visits)   Patient to demonstrate independence and compliance with a comprehensive home exercise program  Patient to improve LEFS score to be better than 85%  Patient to demonstrate improved right hip flexion to be at least 120 degrees without complaints of pain  Patient to perform deep squat without complaints of groin/hip pain  Patient to report no complaints of right hip pain at rest or with activity               Plan  Continue PT, progressing hip strengthening and mobility as tolerated    Treatment Last 4 Visits  Treatment Day: 5 5/8/2025 5/12/2025 5/14/2025 5/19/2025   LE Treatment   Therapeutic Exercise Upright bike L6 x 8 min  Supine figure 4 stretch 2 x 30 sec  H/L QL/hip IR stretch 2 x 30 sec  1/2 tall kneeling hip flexor stretch 2 x 30   Quadruped prayer stretch 3 x 30 sec  S/L quad stretch 2 x 30 sec  Shuttle Bilateral 175# 2 x 15   Upright bike L6 x 6 min  Supine figure 4 stretch 3 x 30 sec  H/L QL/lateral hip stretch 2 x 30 sec  Bridging with blue band 2 x 15  S/L clams blue band 2 x 15  Sidestepping with blue band to fatigue   Nustep L5 x 6 min  H/L QL/lateral hip stretch  Reviewed squatting form/position  Standing gastroc stretch  Wall gastroc stretch  Sidestepping with blue band 2 x to fatigue  S/L clams blue band 2 x  15  Bridging with blue band 2 x 15 Upright bike L6 x 6 min  H/L QL/lateral hip stretch 2 x 30 sec  Sidestepping with blue band 2 x to fatigue  S/L clams blue band 2 x 15  Bridging with blue band 2 x 15  1/2 tall kneeling hip flexor stretch with foot on table       Manual Therapy MWM with lateral glide of hip with belt with passive hip flexion, ER, IR  Long axis hip distraction grade 3 Long axis hip distraction grade 3  Posterior glide of right hip with passive hip flexion  MWM lateral glide of right hip with belt with passive hip flexion and hip IR MWM lateral glide of right hip with belt with passive hip flexion and IR  Long axis hip distraction with passive hip flexion x 10  Long axis hip distraction grade 3 MWM lateral glide of right hip with belt with passive hip flexion and IR   Long axis hip distraction with passive hip flexion x 10   Long axis hip distraction grade 3      Therapeutic Exercise Minutes 32 30 30 30   Manual Therapy Minutes 10 10 10 10   Total Time Of Timed Procedures 42 40 40 40   Total Time Of Service-Based Procedures 0 0 0 0   Total Treatment Time 42 40 40 40   HEP  Supine figure 4 stretch 3 x 30 sec  H/L QL/lateral hip stretch 2 x 30 sec  Bridging with blue band 2 x 15  S/L clams blue band 2 x 15  Sidestepping with blue band to fatigue          HEP  Supine figure 4 stretch 3 x 30 sec  H/L QL/lateral hip stretch 2 x 30 sec  Bridging with blue band 2 x 15  S/L clams blue band 2 x 15  Sidestepping with blue band to fatigue    Charges     TherEx x 2; MT x 1

## 2025-05-23 ENCOUNTER — OFFICE VISIT (OUTPATIENT)
Facility: LOCATION | Age: 25
End: 2025-05-23
Attending: FAMILY MEDICINE
Payer: COMMERCIAL

## 2025-05-23 PROCEDURE — 97110 THERAPEUTIC EXERCISES: CPT

## 2025-05-23 PROCEDURE — 97140 MANUAL THERAPY 1/> REGIONS: CPT

## 2025-05-23 NOTE — PROGRESS NOTES
Patient: Rudy Olivares (24 year old, male) Referring Provider:  Insurance:   Diagnosis: Chronic right hip pain (M25.551,G89.29) Jony Beltran  [place]   Date of Surgery: NA Next MD visit:  N/A   Precautions:  None No data recorded Referral Information:    Date of Evaluation: Req: 0, Auth: 0, Exp:     05/02/25 POC Auth Visits:  8       Today's Date   5/23/2025    Subjective  The patient reports he's been working on exercises more at home and he's been feeling better with less pain       Pain: 0/10     Objective  Special tests; Hip scouring positive, FADIR positive         Assessment       Goals (to be met in 10 visits)   Patient to demonstrate independence and compliance with a comprehensive home exercise program  Patient to improve LEFS score to be better than 85%  Patient to demonstrate improved right hip flexion to be at least 120 degrees without complaints of pain  Patient to perform deep squat without complaints of groin/hip pain  Patient to report no complaints of right hip pain at rest or with activity                 Plan  Continue PT, progressing hip strengthening and mobility as tolerated The patient will work on home exercise program for 3 weeks and then plan to return for continued treatment    Treatment Last 4 Visits  Treatment Day: 6       5/12/2025 5/14/2025 5/19/2025 5/23/2025   LE Treatment   Therapeutic Exercise Upright bike L6 x 6 min  Supine figure 4 stretch 3 x 30 sec  H/L QL/lateral hip stretch 2 x 30 sec  Bridging with blue band 2 x 15  S/L clams blue band 2 x 15  Sidestepping with blue band to fatigue   Nustep L5 x 6 min  H/L QL/lateral hip stretch  Reviewed squatting form/position  Standing gastroc stretch  Wall gastroc stretch  Sidestepping with blue band 2 x to fatigue  S/L clams blue band 2 x 15  Bridging with blue band 2 x 15 Upright bike L6 x 6 min  H/L QL/lateral hip stretch 2 x 30 sec  Sidestepping with blue band 2 x to fatigue  S/L clams blue band 2 x 15  Bridging with blue band  2 x 15  1/2 tall kneeling hip flexor stretch with foot on table     Upright bike L10 x 6 min  H/L QL/lateral hip stretch 2 x 30 sec   Sidestepping with blue band 2 x to fatigue   S/L clams blue band 2 x 15   Bridging with blue band 2 x 15   1/2 tall kneeling hip flexor stretch with foot on table  Standing clams blue band 2 x 15 with 5 sec holds     Manual Therapy Long axis hip distraction grade 3  Posterior glide of right hip with passive hip flexion  MWM lateral glide of right hip with belt with passive hip flexion and hip IR MWM lateral glide of right hip with belt with passive hip flexion and IR  Long axis hip distraction with passive hip flexion x 10  Long axis hip distraction grade 3 MWM lateral glide of right hip with belt with passive hip flexion and IR   Long axis hip distraction with passive hip flexion x 10   Long axis hip distraction grade 3    MWM lateral glide of right hip with belt with passive hip flexion, ER and IR   Long axis hip distraction with passive hip flexion x 10   Long axis hip distraction grade 3      Therapeutic Exercise Minutes 30 30 30 30   Manual Therapy Minutes 10 10 10 10   Total Time Of Timed Procedures 40 40 40 40   Total Time Of Service-Based Procedures 0 0 0 0   Total Treatment Time 40 40 40 40   HEP Supine figure 4 stretch 3 x 30 sec  H/L QL/lateral hip stretch 2 x 30 sec  Bridging with blue band 2 x 15  S/L clams blue band 2 x 15  Sidestepping with blue band to fatigue           HEP  Supine figure 4 stretch 3 x 30 sec  H/L QL/lateral hip stretch 2 x 30 sec  Bridging with blue band 2 x 15  S/L clams blue band 2 x 15  Sidestepping with blue band to fatigue    Charges     TherEx x 2; MT x 1

## 2025-05-26 ENCOUNTER — PATIENT MESSAGE (OUTPATIENT)
Dept: FAMILY MEDICINE CLINIC | Facility: CLINIC | Age: 25
End: 2025-05-26

## 2025-05-26 DIAGNOSIS — M25.551 CHRONIC RIGHT HIP PAIN: Primary | ICD-10-CM

## 2025-05-26 DIAGNOSIS — G89.29 CHRONIC RIGHT HIP PAIN: Primary | ICD-10-CM

## 2025-05-26 DIAGNOSIS — F90.9 ATTENTION DEFICIT HYPERACTIVITY DISORDER (ADHD), UNSPECIFIED ADHD TYPE: ICD-10-CM

## 2025-06-12 ENCOUNTER — APPOINTMENT (OUTPATIENT)
Facility: LOCATION | Age: 25
End: 2025-06-12
Attending: FAMILY MEDICINE
Payer: COMMERCIAL

## 2025-06-16 ENCOUNTER — PATIENT MESSAGE (OUTPATIENT)
Dept: FAMILY MEDICINE CLINIC | Facility: CLINIC | Age: 25
End: 2025-06-16

## 2025-06-19 ENCOUNTER — APPOINTMENT (OUTPATIENT)
Facility: LOCATION | Age: 25
End: 2025-06-19
Attending: FAMILY MEDICINE
Payer: COMMERCIAL

## 2025-06-30 ENCOUNTER — PATIENT MESSAGE (OUTPATIENT)
Dept: CASE MANAGEMENT | Age: 25
End: 2025-06-30

## 2025-06-30 ENCOUNTER — TELEPHONE (OUTPATIENT)
Dept: CASE MANAGEMENT | Age: 25
End: 2025-06-30

## 2025-06-30 DIAGNOSIS — M25.551 CHRONIC RIGHT HIP PAIN: Primary | ICD-10-CM

## 2025-06-30 DIAGNOSIS — G89.29 CHRONIC RIGHT HIP PAIN: Primary | ICD-10-CM

## 2025-06-30 NOTE — TELEPHONE ENCOUNTER
Please refer to [facility] notify patient of MRI denial, try my chart if not able to reach via phone

## 2025-06-30 NOTE — TELEPHONE ENCOUNTER
MRI R Hip        Status: DENIED        Reference number [id number]     A copy of the denial letter is filed under the MEDIA tab, reference for complete details. You may reach out to [facility] at [phone] to discuss decision.     Please reach out to patient with plan of care.      Thank you      WHY THIS REQUEST WAS NOT APPROVED  From the Medical Director:  Your doctor is checking you for a tear in your hip. Your doctor ordered an MRI of your hip. An MRI is a way  to take pictures of the inside of your body. This test is needed when your condition has not improved after  six weeks of treatment by your doctor. Treatment should include medications and other forms of therapy.  These need to include home exercises or physical therapy. We reviewed the notes we have. The notes do  not show that you had at least six weeks of such treatment. Based on the information we have, this test is  not medically necessary. We used [facility] Medical Benefits Management Clinical Guideline titled Imaging  of the Extremities to make this decision. You may view this guideline at [email or web address].

## 2025-06-30 NOTE — TELEPHONE ENCOUNTER
Dr. Beltran: please advise       Attempted to reach, unable to leave voicemail as mailbox is full. [employer] message also sent

## 2025-07-01 NOTE — TELEPHONE ENCOUNTER
Called and spoke to pt. Informed him MRI was denied and advised him to cancel appointment. He verbalized understanding and is agreeable to plan. Will call and schedule with ortho.

## 2025-07-06 ENCOUNTER — PATIENT MESSAGE (OUTPATIENT)
Dept: FAMILY MEDICINE CLINIC | Facility: CLINIC | Age: 25
End: 2025-07-06

## 2025-07-06 DIAGNOSIS — F90.9 ATTENTION DEFICIT HYPERACTIVITY DISORDER (ADHD), UNSPECIFIED ADHD TYPE: ICD-10-CM

## 2025-07-08 RX ORDER — LISDEXAMFETAMINE DIMESYLATE 30 MG/1
30 TABLET, CHEWABLE ORAL DAILY
Qty: 30 TABLET | Refills: 0 | Status: SHIPPED | OUTPATIENT
Start: 2025-07-08 | End: 2025-08-07

## 2025-07-18 ENCOUNTER — TELEPHONE (OUTPATIENT)
Dept: ORTHOPEDICS CLINIC | Facility: CLINIC | Age: 25
End: 2025-07-18

## 2025-07-18 DIAGNOSIS — M25.551 RIGHT HIP PAIN: Primary | ICD-10-CM

## 2025-07-21 ENCOUNTER — HOSPITAL ENCOUNTER (OUTPATIENT)
Dept: GENERAL RADIOLOGY | Age: 25
Discharge: HOME | End: 2025-07-21
Payer: COMMERCIAL

## 2025-07-21 ENCOUNTER — TELEPHONE (OUTPATIENT)
Dept: PHYSICAL THERAPY | Age: 25
End: 2025-07-21

## 2025-07-21 ENCOUNTER — OFFICE VISIT (OUTPATIENT)
Facility: CLINIC | Age: 25
End: 2025-07-21
Payer: COMMERCIAL

## 2025-07-21 VITALS — BODY MASS INDEX: 33.59 KG/M2 | HEIGHT: 72 IN | WEIGHT: 248 LBS

## 2025-07-21 DIAGNOSIS — G89.29 CHRONIC RIGHT HIP PAIN: Primary | ICD-10-CM

## 2025-07-21 DIAGNOSIS — F90.9 ATTENTION DEFICIT HYPERACTIVITY DISORDER (ADHD), UNSPECIFIED ADHD TYPE: ICD-10-CM

## 2025-07-21 DIAGNOSIS — M25.551 RIGHT HIP PAIN: ICD-10-CM

## 2025-07-21 DIAGNOSIS — M25.551 CHRONIC RIGHT HIP PAIN: Primary | ICD-10-CM

## 2025-07-21 RX ORDER — MELOXICAM 15 MG/1
15 TABLET ORAL DAILY
Qty: 30 TABLET | Refills: 0 | Status: SHIPPED | OUTPATIENT
Start: 2025-07-21

## 2025-07-21 RX ORDER — LISDEXAMFETAMINE DIMESYLATE 30 MG/1
30 TABLET, CHEWABLE ORAL DAILY
Qty: 30 TABLET | Refills: 0 | COMMUNITY
Start: 2025-07-21 | End: 2025-07-21

## 2025-07-21 NOTE — PROGRESS NOTES
[facility] New Patient Note         The following individual(s) verbally consented to be recorded using ambient AI listening technology and understand that they can each withdraw their consent to this listening technology at any point by asking the clinician to turn off or pause the recording:    Patient name: Rudy Olivares      Chief Complaint   Patient presents with    Hip Pain     Right hip pain currently feeling it bilateral hip pain dull achy consistent pain no nsaids or Tylenol no ice or heat . Onset for years February it has been worse        History of Present Illness  Rudy Olivares is a 24 year old male who presents with right hip pain following an injury during squatting exercises in February 2025. Prior to this, he did not have any issues with his hips.    In February, while performing squats, he experienced an injury to his right hip while performing the squat and felt like he \"went too far\". Post-injury, he experienced a locking sensation in the hip, preventing him from squatting past a certain degree.  He describes the pain as an achy and throbbing sensation that is more of a discomfort rather than sharp pain.  Pain is localized to the hip joint and does not radiate into the groin or buttock.  Occasionally, the pain will radiate to just about the knee.  The pain is not constant and seems to be very intermittent in nature.    The pain flares up with increased activity, such as prolonged walking or standing, and occasionally feels unstable, prompting him to sit down. No pain during short walks or while sitting, but pain can start if sitting for too long. No pain during sleep.    He completed six to eight weeks of physical therapy from mid-April to late May, which included banded exercises. This therapy, along with weight loss, has provided relief, but he remains cautious about testing the hip with squats due to fear of exacerbating the injury.  The pain does seem to be getting better with  time.    His insurance denied an MRI, suggesting an orthopedic consultation first. He is seeking further evaluation and potential imaging to better understand the extent of his injury.  He is here today for further evaluation    Past Medical History[1]  Past Surgical History[2]  Current Medications[3]  Allergies[4]  Family History[5]  Social History     Occupational History    Not on file   Tobacco Use    Smoking status: Former     Types: Cigars    Smokeless tobacco: Never   Vaping Use    Vaping status: Never Used   Substance and Sexual Activity    Alcohol use: Yes     Alcohol/week: 0.0 standard drinks of alcohol     Comment: social drinker    Drug use: No    Sexual activity: Not on file        ROS:  Comprehensive system review obtained and negative except as mentioned above    Physical Exam:      Physical Exam      Ht 6' (1.829 m)   Wt 248 lb (112.5 kg)   BMI 33.63 kg/m²   Constitutional: Awake, alert, no distress.  Pleasant  Psychological: Appropriate affect.  Respiratory: Unlabored breathing.  Right lower extremity:  Inspection: skin is intact without any redness, deformity, or effusion.   Palpation: No tenderness to palpation over the greater trochanteric bursa.  No tenderness to palpation of the proximal or distal IT band.  No tenderness palpation of the proximal or distal quad.  No tenderness palpation over the ASIS.  No appreciable quad atrophy  Range of motion: Internal rotation of hip to approximately 15 degrees. External rotation of hip to approximately 30 degrees.  No pain with internal or external rotation of the hip.  No pain with active or resisted hip flexion.  No pain with hip abduction or adduction.  Stinchfield test negative.   Negative straight leg raise  Neuromuscular: Strength is normal and sensation is intact.  Vascular: Extremities are warm and well-perfused.  Lymph: Unremarkable.      Results  Imaging: Imaging was personally viewed, independently interpreted and radiology report read. They  show: No acute fracture or dislocation.  No appreciable joint effusion or soft tissue swelling.  Femoral acetabular relationship is within normal limits.  Joint spaces are very well-preserved without any signs of degenerative joint disease.  No other acute bony abnormalities noted.      Assessment & Plan  Assessment: 24-year-old male with chronic right hip pain    Plan: I reviewed x-ray and physical exam findings with the patient which is consistent with chronic right hip pain.  I explained that radiographs are relatively unremarkable with no signs of an acute fracture or dislocation, no appreciable joint effusion or soft tissue swelling, the femoral acetabular relationship is within normal limits, and joint space are very well-preserved without any significant signs of degenerative joint disease.  Physical exam is also relatively unremarkable without any exam maneuvers reproducing pain.  As such, recommend we continue with conservative management at this time including activity modification, icing, oral anti-inflammatory medications, and continued physical therapy.  As over-the-counter medications have not been providing significant relief in symptoms, he is interested in trying a prescription oral anti-inflammatory which would be reasonable.  Meloxicam was sent to the patient's pharmacy and advised him to take this for 2 weeks and save the other half for as needed use only.  Do not take any other over-the-counter or prescription NSAIDs will take the meloxicam.  He may alternate Tylenol as needed help with the pain.  As physical therapy seemed to provide relief when he was participating in the sessions, I do believe he would benefit from continued physical therapy to work on strengthening and conditioning the hips and supplementing home exercise program between sessions with he is in agreement with.  An internal prescription for formal physical therapy was placed at today's visit he will call to schedule more  sessions soon.  If symptoms persist or do not get better with conservative treatment, we will consider an MRI of the right hip in the future.  He will follow-up with me in 6 weeks or sooner with any worsening symptoms or concerns.  All questions and concerns are addressed and answered the patient's satisfaction.  He is in agreement with the treatment plan going forward      EDWARD Best, AMANDO  Orthopedic Surgery   t: [phone]  f: [phone]           This document was partially prepared using Dragon Medical voice recognition software.  Although every attempt is made to correct errors during dictation, discrepancies may still exist. Please contact me with any questions or clarifications.         [1]   Past Medical History:   Anxiety    Anxiety state    Cancer (HCC)    lymphoma    Depression    d/c'd medications    Muscle weakness   [2]   Past Surgical History:  Procedure Laterality Date    Biopsy     [3]   Current Outpatient Medications   Medication Sig Dispense Refill    Lisdexamfetamine Dimesylate (VYVANSE) 30 MG Oral Chew Tab Chew 30 mg by mouth daily. 30 tablet 0    Lisdexamfetamine Dimesylate (VYVANSE) 30 MG Oral Chew Tab Chew 30 mg by mouth daily. 30 tablet 0    cyclobenzaprine 10 MG Oral Tab Take 1 tablet (10 mg total) by mouth nightly as needed for Muscle spasms. 15 tablet 1    traZODone 50 MG Oral Tab Take 1 tablet (50 mg total) by mouth nightly. 30 tablet 5   [4] No Known Allergies  [5]   Family History  Problem Relation Age of Onset    Diabetes Father     Other (Other) Father     Anxiety Mother     Depression Mother     Depression Maternal Grandmother     Substance Abuse Maternal Grandfather     Depression Maternal Grandfather     Anxiety Maternal Grandfather     Substance Abuse Paternal Grandfather

## 2025-07-24 ENCOUNTER — TELEPHONE (OUTPATIENT)
Dept: PHYSICAL THERAPY | Age: 25
End: 2025-07-24

## 2025-07-31 ENCOUNTER — TELEPHONE (OUTPATIENT)
Dept: PHYSICAL THERAPY | Age: 25
End: 2025-07-31

## (undated) DEVICE — STERILE POLYISOPRENE POWDER-FREE SURGICAL GLOVES: Brand: PROTEXIS

## (undated) DEVICE — BLADE ELECTRODE: Brand: EDGE

## (undated) DEVICE — CV PACK-LF: Brand: MEDLINE INDUSTRIES, INC.

## (undated) DEVICE — SYRINGE 10ML LL TIP

## (undated) DEVICE — CATH DRAIN 24F HYDRAGLIDE XL

## (undated) DEVICE — SUTURE SILK 0 FSL

## (undated) DEVICE — SUTURE MONOCRYL 4-0 PS-2

## (undated) DEVICE — SOL H2O 1000ML BTL

## (undated) DEVICE — SOLUTION ENDOSCOPIC ANTI-FOG NON-TOXIC NON-ABRASIVE 6 CUBIC CENTIMETER WITH RADIOPAQUE ADHESIVE-BACKED SPONGE STERILE NOT MADE WITH NATURAL RUBBER LATEX MEDICHOICE: Brand: MEDICHOICE

## (undated) DEVICE — ABSORBABLE HEMOSTAT (OXIDIZED REGENERATED CELLULOSE, U.S.P.): Brand: SURGICEL

## (undated) DEVICE — ARYGLE SUCTION CATHETER WITH CHIMNEY VALVE STRIAGHT PACKED 14 FR/ CH: Brand: ARGYLE

## (undated) DEVICE — SCD SLEEVE KNEE HI BLEND

## (undated) DEVICE — Device

## (undated) DEVICE — DRAIN CHEST DRY ADULT/PED

## (undated) DEVICE — NON-ADHERENT PAD PREPACK: Brand: TELFA

## (undated) DEVICE — GLOVE SURG TRIUMPH SZ 8

## (undated) DEVICE — GAUZE SPONGES,USP TYPE VII GAUZE, 12 PLY: Brand: CURITY

## (undated) DEVICE — SUTURE VICRYL 2-0 CT-1

## (undated) DEVICE — STANDARD HYPODERMIC NEEDLE,POLYPROPYLENE HUB: Brand: MONOJECT

## (undated) DEVICE — MEDI-VAC NON-CONDUCTIVE SUCTION TUBING: Brand: CARDINAL HEALTH

## (undated) DEVICE — 3M™ IOBAN™ 2 ANTIMICROBIAL INCISE DRAPE 6650EZ: Brand: IOBAN™ 2

## (undated) DEVICE — FLOSEAL HEMOSTATIC MATRIX, 5ML: Brand: FLOSEAL HEMOSTATIC MATRIX

## (undated) DEVICE — WOUND RETRACTOR AND PROTECTOR: Brand: ALEXIS WOUND PROTECTOR-RETRACTOR

## (undated) DEVICE — EXOFIN TISSUE ADHESIVE 1.0ML

## (undated) DEVICE — ENSEAL X1 TISSUE SEALER, CURVED JAW, 37 CM SHAFT LENGTH: Brand: ENSEAL

## (undated) NOTE — Clinical Note
06/08/2017        Lorraine Kalina  [address] [facility] [address]      Dear Bobo Melvin,    [facility] records indicate that you have outstanding lab work and or testing that was ordered for you and has not yet been completed:      NANCY Wade W P

## (undated) NOTE — LETTER
Patient Name: Gin Pederson  : 2000  MRN: [id number]  Patient Address: [address] [place] [id number]      Coronavirus Disease 2019 (COVID-19)     [facility] is committed to the safety and well-being of our patients, mem carefully. If your symptoms get worse, call your healthcare provider immediately. 3. Get rest and stay hydrated.    4. If you have a medical appointment, call the healthcare provider ahead of time and tell them that you have or may have COVID-19.  5. For m of fever-reducing medications; and  · Improvement in respiratory symptoms (e.g., cough, shortness of breath); and  · At least 10 days have passed since symptoms first appeared OR if asymptomatic patient or date of symptom onset is unclear then use 10 days donors must:    · Have had a confirmed diagnosis of COVID-19  · Be symptom-free for at least 14 days*    *Some people will be required to have a repeat COVID-19 test in order to be eligible to donate.  If you’re instructed by Rajwinder that a repeat test is r random. Researchers are trying to identify similarities between people with a Post-COVID condition to better understand if there are risk factors. How do I prevent a Post-COVID condition?   The best way to prevent the long-term symptoms of COVID-19 is

## (undated) NOTE — LETTER
Date: 11/15/2021    Patient Name: Harvin Dandy          To Whom it may concern: This letter has been written at the patient's request. The above patient was seen at the [facility] for treatment of a medical condition.  He has been diag

## (undated) NOTE — Clinical Note
Date: 3/16/2017    Patient Name: Liu Arguello          To Whom it may concern: The above patient was seen at the [facility] for treatment of a medical condition. This patient should be excused from attending school 3/16/17.         Si

## (undated) NOTE — LETTER
Patient Name: Andie Bejarano  : 2000  MRN: [id number]  Patient Address: [address] [place] [address]      Coronavirus Disease 2019 (COVID-19)     [facility] is committed to the safety and well-being of our patients, mem carefully. If your symptoms get worse, call your healthcare provider immediately. 3. Get rest and stay hydrated.    4. If you have a medical appointment, call the healthcare provider ahead of time and tell them that you have or may have COVID-19.  5. For m of fever-reducing medications; and  · Improvement in respiratory symptoms (e.g., cough, shortness of breath); and  · At least 10 days have passed since symptoms first appeared OR if asymptomatic patient or date of symptom onset is unclear then use 10 days donors must:    · Have had a confirmed diagnosis of COVID-19  · Be symptom-free for at least 14 days*    *Some people will be required to have a repeat COVID-19 test in order to be eligible to donate.  If you’re instructed by Rajwinder that a repeat test is r random. Researchers are trying to identify similarities between people with a Post-COVID condition to better understand if there are risk factors. How do I prevent a Post-COVID condition?   The best way to prevent the long-term symptoms of COVID-19 is

## (undated) NOTE — MR AVS SNAPSHOT
[address] [facility] [address] [phone]               Thank you for choosing us for your health care visit with GIDEON Angeles.   We are glad to serve you and happy to provide you with this summary of y · Don't sit for long periods. Try not to take long car rides or take other trips that have you sitting for a long time. This puts more stress on the lower back than standing or walking.   · During the first 24 to 72 hours after an injury or flare-up, apply Follow-up care  Follow up with your healthcare provider, or as advised. You may need physical therapy or more tests if your symptoms get worse. If you had X-rays your healthcare provider may be checking for any broken bones, breaks, or fractures.  Bruises - [address] [address], [place] [address] [address], [address] [facility]     Phone:  [phone]    - Cyclobenzaprine HCl 5 MG Tabs            MyChart     Sign up for MyChart access for your child.   Ian Farias o creating a rainbow shopping list to find colorful fruits and vegetables  o go on a walking scavenger hunt through the neighborhood   o grow a family garden    In addition to 5, 4, 3, 2, 1 families can make small changes in their family routines to help e

## (undated) NOTE — LETTER
[address]  [address] [facility] [address], [address] [facility]  Authorization for Surgical Operation and Procedure     Date:___________                                                                                                         Time:__________ that can occur: fever and allergic reactions, hemolytic reactions, transmission of diseases such as Hepatitis, AIDS and Cytomegalovirus (CMV) and fluid overload.   In the event that I wish to have an autologous transfusion of my own blood, or a directed don when the applicable recovery period ends for purposes of reinstating the DNAR order.   10. Patients having a sterilization procedure: I understand that if the procedure is successful the results will be permanent and it will therefore be impossible for me t doctor) to give me medicine and do additional procedures as necessary.  Some examples are: Starting or using an “IV” to give me medicine, fluids or blood during my procedure, and having a breathing tube placed to help me breathe when I’m asleep (intubation) understand that rare but potential complications include headache, bleeding, infection, seizure, irregular heart rhythms, and nerve injury.     I can change my mind about having anesthesia services at any time before I get the medicine.    _________________

## (undated) NOTE — MR AVS SNAPSHOT
[address] [employer] [address] [phone]               Thank you for choosing us for your health care visit with Carmelo Lozano MD.  We are glad to serve you and happy to provide you with this summary of your Assoc Dx: Fatigue, unspecified type [R53.83]           Comp Metabolic Panel (14) [E]    Complete by:   May 09, 2017 (Approximate)    Assoc Dx:  Screening for endocrine, metabolic and immunity disorder [Z13.29, Z13.228, Z13.0]           Lipid Panel [E] - [address] [facility] [address], [place] [address] [address], [address] [facility]     Phone:  [phone]    - carbamide peroxide 6.5 % Soln            Immunizations Administered in the Office Today     MENINGOCOCCAL

## (undated) NOTE — LETTER
10/22/19        Janeth Haynes  [address] [facility] [address]      Dear Eber Combs,    [facility] records indicate that you have outstanding lab work and or testing that was ordered for you and has not yet been completed:  Orders Placed This Encounter

## (undated) NOTE — Clinical Note
Date: 3/16/2017    Patient Name: Anabel Noble          To Whom it may concern: The above patient was seen at the [facility] for treatment of a medical condition. This patient should be excused from attending PE 3/16/17-3/23/17.

## (undated) NOTE — LETTER
04/18/19        Mary Novoa  [address] [facility] [address]      Dear Grant Young,    [address] [facility] records indicate that you have outstanding lab work and or testing that was ordered for you and has not yet been completed:              T Pallidum Scree

## (undated) NOTE — MR AVS SNAPSHOT
After Visit Summary   11/20/2021    Sara Mancini   MRN: [id number]           Visit Information     Date & Time  11/20/2021  9:00 AM Provider   [facility] Therapy Dept.  Phone  [phone] all